# Patient Record
Sex: FEMALE | Race: WHITE | NOT HISPANIC OR LATINO | Employment: FULL TIME | URBAN - METROPOLITAN AREA
[De-identification: names, ages, dates, MRNs, and addresses within clinical notes are randomized per-mention and may not be internally consistent; named-entity substitution may affect disease eponyms.]

---

## 2023-09-14 ENCOUNTER — OFFICE VISIT (OUTPATIENT)
Dept: INTERNAL MEDICINE | Facility: CLINIC | Age: 43
End: 2023-09-14
Payer: COMMERCIAL

## 2023-09-14 VITALS
SYSTOLIC BLOOD PRESSURE: 112 MMHG | TEMPERATURE: 98.5 F | WEIGHT: 198.4 LBS | BODY MASS INDEX: 33.02 KG/M2 | HEART RATE: 105 BPM | DIASTOLIC BLOOD PRESSURE: 72 MMHG | RESPIRATION RATE: 16 BRPM | OXYGEN SATURATION: 98 %

## 2023-09-14 DIAGNOSIS — G43.809 OTHER MIGRAINE WITHOUT STATUS MIGRAINOSUS, NOT INTRACTABLE: ICD-10-CM

## 2023-09-14 DIAGNOSIS — Z12.31 ENCOUNTER FOR SCREENING MAMMOGRAM FOR BREAST CANCER: ICD-10-CM

## 2023-09-14 DIAGNOSIS — R00.2 PALPITATIONS: ICD-10-CM

## 2023-09-14 DIAGNOSIS — Z00.00 ANNUAL PHYSICAL EXAM: Primary | ICD-10-CM

## 2023-09-14 DIAGNOSIS — E53.8 B12 DEFICIENCY: ICD-10-CM

## 2023-09-14 DIAGNOSIS — Z11.59 SCREENING FOR VIRAL DISEASE: ICD-10-CM

## 2023-09-14 DIAGNOSIS — F17.210 CIGARETTE NICOTINE DEPENDENCE WITHOUT COMPLICATION: ICD-10-CM

## 2023-09-14 DIAGNOSIS — Z13.220 SCREENING FOR CHOLESTEROL LEVEL: ICD-10-CM

## 2023-09-14 DIAGNOSIS — Z13.29 SCREENING FOR THYROID DISORDER: ICD-10-CM

## 2023-09-14 DIAGNOSIS — R07.9 CHEST PAIN, UNSPECIFIED TYPE: ICD-10-CM

## 2023-09-14 LAB
ALBUMIN SERPL-MCNC: 4.3 G/DL (ref 3.5–5.2)
ALBUMIN/GLOB SERPL: 1.6 G/DL
ALP SERPL-CCNC: 71 U/L (ref 39–117)
ALT SERPL W P-5'-P-CCNC: 11 U/L (ref 1–33)
ANION GAP SERPL CALCULATED.3IONS-SCNC: 11.4 MMOL/L (ref 5–15)
AST SERPL-CCNC: 16 U/L (ref 1–32)
BASOPHILS # BLD AUTO: 0.04 10*3/MM3 (ref 0–0.2)
BASOPHILS NFR BLD AUTO: 0.6 % (ref 0–1.5)
BILIRUB SERPL-MCNC: 0.4 MG/DL (ref 0–1.2)
BUN SERPL-MCNC: 11 MG/DL (ref 6–20)
BUN/CREAT SERPL: 13.6 (ref 7–25)
CALCIUM SPEC-SCNC: 9.3 MG/DL (ref 8.6–10.5)
CHLORIDE SERPL-SCNC: 104 MMOL/L (ref 98–107)
CHOLEST SERPL-MCNC: 205 MG/DL (ref 0–200)
CO2 SERPL-SCNC: 23.6 MMOL/L (ref 22–29)
CREAT SERPL-MCNC: 0.81 MG/DL (ref 0.57–1)
DEPRECATED RDW RBC AUTO: 41.6 FL (ref 37–54)
EGFRCR SERPLBLD CKD-EPI 2021: 92.5 ML/MIN/1.73
EOSINOPHIL # BLD AUTO: 0.09 10*3/MM3 (ref 0–0.4)
EOSINOPHIL NFR BLD AUTO: 1.3 % (ref 0.3–6.2)
ERYTHROCYTE [DISTWIDTH] IN BLOOD BY AUTOMATED COUNT: 12.8 % (ref 12.3–15.4)
FOLATE SERPL-MCNC: 14.9 NG/ML (ref 4.78–24.2)
GLOBULIN UR ELPH-MCNC: 2.7 GM/DL
GLUCOSE SERPL-MCNC: 82 MG/DL (ref 65–99)
HCT VFR BLD AUTO: 37.4 % (ref 34–46.6)
HCV AB SER DONR QL: NORMAL
HDLC SERPL-MCNC: 46 MG/DL (ref 40–60)
HGB BLD-MCNC: 13.2 G/DL (ref 12–15.9)
IMM GRANULOCYTES # BLD AUTO: 0.02 10*3/MM3 (ref 0–0.05)
IMM GRANULOCYTES NFR BLD AUTO: 0.3 % (ref 0–0.5)
LDLC SERPL CALC-MCNC: 136 MG/DL (ref 0–100)
LDLC/HDLC SERPL: 2.9 {RATIO}
LYMPHOCYTES # BLD AUTO: 1.76 10*3/MM3 (ref 0.7–3.1)
LYMPHOCYTES NFR BLD AUTO: 26.3 % (ref 19.6–45.3)
MCH RBC QN AUTO: 32.3 PG (ref 26.6–33)
MCHC RBC AUTO-ENTMCNC: 35.3 G/DL (ref 31.5–35.7)
MCV RBC AUTO: 91.4 FL (ref 79–97)
MONOCYTES # BLD AUTO: 0.48 10*3/MM3 (ref 0.1–0.9)
MONOCYTES NFR BLD AUTO: 7.2 % (ref 5–12)
NEUTROPHILS NFR BLD AUTO: 4.3 10*3/MM3 (ref 1.7–7)
NEUTROPHILS NFR BLD AUTO: 64.3 % (ref 42.7–76)
NRBC BLD AUTO-RTO: 0 /100 WBC (ref 0–0.2)
PLATELET # BLD AUTO: 321 10*3/MM3 (ref 140–450)
PMV BLD AUTO: 10.4 FL (ref 6–12)
POTASSIUM SERPL-SCNC: 4.2 MMOL/L (ref 3.5–5.2)
PROT SERPL-MCNC: 7 G/DL (ref 6–8.5)
RBC # BLD AUTO: 4.09 10*6/MM3 (ref 3.77–5.28)
SODIUM SERPL-SCNC: 139 MMOL/L (ref 136–145)
TRIGL SERPL-MCNC: 127 MG/DL (ref 0–150)
TSH SERPL DL<=0.05 MIU/L-ACNC: 1.38 UIU/ML (ref 0.27–4.2)
VIT B12 BLD-MCNC: 592 PG/ML (ref 211–946)
VLDLC SERPL-MCNC: 23 MG/DL (ref 5–40)
WBC NRBC COR # BLD: 6.69 10*3/MM3 (ref 3.4–10.8)

## 2023-09-14 PROCEDURE — 85025 COMPLETE CBC W/AUTO DIFF WBC: CPT | Performed by: PHYSICIAN ASSISTANT

## 2023-09-14 PROCEDURE — 86803 HEPATITIS C AB TEST: CPT | Performed by: PHYSICIAN ASSISTANT

## 2023-09-14 PROCEDURE — 82746 ASSAY OF FOLIC ACID SERUM: CPT | Performed by: PHYSICIAN ASSISTANT

## 2023-09-14 PROCEDURE — 82607 VITAMIN B-12: CPT | Performed by: PHYSICIAN ASSISTANT

## 2023-09-14 PROCEDURE — 80053 COMPREHEN METABOLIC PANEL: CPT | Performed by: PHYSICIAN ASSISTANT

## 2023-09-14 PROCEDURE — 80061 LIPID PANEL: CPT | Performed by: PHYSICIAN ASSISTANT

## 2023-09-14 PROCEDURE — 84443 ASSAY THYROID STIM HORMONE: CPT | Performed by: PHYSICIAN ASSISTANT

## 2023-09-14 NOTE — ASSESSMENT & PLAN NOTE
EKG WNL today. Will request previous records and echo. Will get labs today. To er if red flag sx occur, chest pain that moves to jaw/shoulder, dizziness, syncope, loc. Will order echo and holter to evaluate symptoms further. Pt understands and agrees with plan.

## 2023-09-14 NOTE — PROGRESS NOTES
Chief Complaint  EST CARE, physical, Chest pain    Subjective          Sheba Arnett presents to [facility]  History of Present Illness  Last PCP: Graciela Ruelas in [facility]  Previous Specialists: none   Last labs: months ago  Last mammogram: >1 yr , family history of breast cancer- maternal grandma  Last pap: with hysterectomy   Last colonoscopy: none,  no family history of colon cancer     Palpitations: states at times heart flutters.   Sx comes and goes.   At times if she lays on L side she gets chest pain. Last time this occurred was last wk.   States it happens at rest. Other times with activity. Denies radiation of jaw/shoulder pain.  Denies current chest pain.   She admits to swelling in feet at times. She works 10-12 hr/day on her feet.   She is a  and does pipe laying/digging.    Pt was told she had a leaky valve in heart a few years ago   She states she had stress test and echo done at this time.   States she had regurgitation    Smokes 2 PPD x 30 years     Migraine: had one a month ago  Has been 8 yr since prior    B12 def: previously on supplement    History reviewed. No pertinent past medical history.     Past Surgical History:   Procedure Laterality Date    APPENDECTOMY      HYSTERECTOMY      NISSEN FUNDOPLICATION          Current Outpatient Medications on File Prior to Visit   Medication Sig Dispense Refill    acyclovir (ZOVIRAX) 400 MG tablet Take 2 Tablet by mouth Five times a day for 7 days.      cetirizine (zyrTEC) 10 MG tablet Take 1 tablet by mouth Daily. 14 tablet 0    cyclobenzaprine (FLEXERIL) 10 MG tablet Take 1 tablet by mouth 3 (Three) Times a Day As Needed.       MG tablet Take 1 Tablet by mouth EVERY 6 TO 8 HOURS for Pain.      omeprazole (priLOSEC) 40 MG capsule Take 1 capsule by mouth Daily.      SUMAtriptan (IMITREX) 50 MG tablet Take 1 tablet by mouth Every 2 (Two) Hours As Needed for Migraine. Take one tablet at onset of  headache. May repeat dose one time in 2 hours if headache not relieved.  Do not take more than 4 tablets in a 24-hour period 8 tablet 0     No current facility-administered medications on file prior to visit.        Allergies   Allergen Reactions    Codeine Itching       Social History     Tobacco Use   Smoking Status Every Day    Packs/day: 2.00    Years: 30.00    Pack years: 60.00    Types: Cigarettes   Smokeless Tobacco Never          Objective   Vital Signs:   /72 (BP Location: Left arm, Patient Position: Sitting, Cuff Size: Adult)   Pulse 105   Temp 98.5 °F (36.9 °C) (Temporal)   Resp 16   Wt 90 kg (198 lb 6.4 oz)   SpO2 98%   BMI 33.02 kg/m²     Physical Exam  Vitals reviewed.   Constitutional:       Appearance: Normal appearance.   HENT:      Head: Normocephalic and atraumatic.      Nose: Nose normal.      Mouth/Throat:      Mouth: Mucous membranes are moist.   Eyes:      Extraocular Movements: Extraocular movements intact.      Conjunctiva/sclera: Conjunctivae normal.      Pupils: Pupils are equal, round, and reactive to light.   Cardiovascular:      Rate and Rhythm: Normal rate and regular rhythm.   Pulmonary:      Effort: Pulmonary effort is normal.      Breath sounds: Normal breath sounds.   Abdominal:      General: Abdomen is flat. Bowel sounds are normal.      Palpations: Abdomen is soft.   Musculoskeletal:         General: Normal range of motion.   Neurological:      General: No focal deficit present.      Mental Status: She is alert and oriented to person, place, and time.   Psychiatric:         Mood and Affect: Mood normal.      Result Review :            BMI is >= 30 and <35. (Class 1 Obesity). The following options were offered after discussion;: weight loss educational material (shared in after visit summary)      ECG 12 Lead    Date/Time: 9/15/2023 9:20 AM  Performed by: Rita Mcdonough PA-C  Authorized by: Rita Mcdonough PA-C   Comparison: not compared with previous ECG    Previous ECG: no previous ECG available  Rhythm: sinus rhythm  Rate: normal  BPM: 75  Conduction: conduction normal  ST Segments: ST segments normal  T Waves: T waves normal  QRS axis: normal  Other: no other findings    Clinical impression: normal ECG           Assessment and Plan    Diagnoses and all orders for this visit:    1. Annual physical exam (Primary)  Assessment & Plan:  Reviewed preventative medication recommendations that are age appropriate for the patient. Education provided for health and wellness. Encouraged healthy diet, regular exercise, and routine wellness checkups.        2. Encounter for screening mammogram for breast cancer  -     Mammo Screening Digital Tomosynthesis Bilateral With CAD    3. Screening for viral disease  -     Hepatitis C antibody    4. Chest pain, unspecified type  Assessment & Plan:  EKG WNL today. Will request previous records and echo. Will get labs today. To er if red flag sx occur, chest pain that moves to jaw/shoulder, dizziness, syncope, loc. Will order echo and holter to evaluate symptoms further. Pt understands and agrees with plan.    Orders:  -     Comprehensive Metabolic Panel  -     CBC & Differential  -     ECG 12 Lead  -     Adult Transthoracic Echo Complete w/ Color, Spectral and Contrast if necessary per protocol; Future  -     Holter monitor - 24 hour; Future    5. Screening for cholesterol level  -     Lipid Panel    6. Screening for thyroid disorder  -     TSH    7. B12 deficiency  Comments:  will check level today to determine if tx indicated  Orders:  -     Vitamin B12  -     Folate    8. Other migraine without status migrainosus, not intractable  Comments:  cont PRN triptan    9. Palpitations  -     Adult Transthoracic Echo Complete w/ Color, Spectral and Contrast if necessary per protocol; Future  -     Holter monitor - 24 hour; Future    10. Cigarette nicotine dependence without complication  Assessment & Plan:  Discussed risks of smoking with  patient including death, blood pressure elevation, heart attack, stroke, kidney disese, blindness, slow wound healing. Pt is not ready to quit smoking at this time, encouraged to RTC once ready to quit.          Follow Up   Return in about 1 month (around 10/14/2023).  Patient was given instructions and counseling regarding her condition or for health maintenance advice. Please see specific information pulled into the AVS if appropriate.

## 2023-09-15 ENCOUNTER — TELEPHONE (OUTPATIENT)
Dept: INTERNAL MEDICINE | Facility: CLINIC | Age: 43
End: 2023-09-15
Payer: COMMERCIAL

## 2023-09-15 PROBLEM — Z00.00 ANNUAL PHYSICAL EXAM: Status: ACTIVE | Noted: 2023-09-15

## 2023-09-15 PROBLEM — Z00.00 ANNUAL PHYSICAL EXAM: Status: RESOLVED | Noted: 2023-09-15 | Resolved: 2023-09-15

## 2023-09-15 NOTE — TELEPHONE ENCOUNTER
Caller: Sheba Arnett    Relationship: Self    Best call back number: [phone]     What is the best time to reach you: ANYTIME    Who are you requesting to speak with (clinical staff, provider,  specific staff member): CLINICAL    Do you know the name of the person who called: NO    What was the call regarding: TEST RESULTS    Is it okay if the provider responds through [employer]: NO      PATIENT STATES SHE MISSED A CALL ABOUT HER RESULTS. PATIENT STATES SHE SEES THEM ON MYCHART BUT WANTS TO DISCUSS HER COVID TEST AND WHY SHE IS STILL POSITIVE STILL.

## 2023-09-15 NOTE — PROGRESS NOTES
I have reviewed the notes, assessments, and/or procedures performed by Rita Mcdonough PA-C, I concur with her documentation of Sheba Arnett.

## 2023-09-28 ENCOUNTER — HOSPITAL ENCOUNTER (OUTPATIENT)
Dept: MAMMOGRAPHY | Facility: HOSPITAL | Age: 43
Discharge: HOME | End: 2023-09-28
Admitting: PHYSICIAN ASSISTANT
Payer: COMMERCIAL

## 2023-09-28 PROCEDURE — 77067 SCR MAMMO BI INCL CAD: CPT

## 2023-09-28 PROCEDURE — 77063 BREAST TOMOSYNTHESIS BI: CPT

## 2023-10-06 ENCOUNTER — HOSPITAL ENCOUNTER (OUTPATIENT)
Dept: CARDIOLOGY | Facility: HOSPITAL | Age: 43
Discharge: HOME | End: 2023-10-06
Payer: COMMERCIAL

## 2023-10-06 DIAGNOSIS — R00.2 PALPITATIONS: ICD-10-CM

## 2023-10-06 DIAGNOSIS — R07.9 CHEST PAIN, UNSPECIFIED TYPE: ICD-10-CM

## 2023-10-06 PROCEDURE — 93306 TTE W/DOPPLER COMPLETE: CPT

## 2023-10-07 LAB
BH CV ECHO MEAS - AO MAX PG: 10 MMHG
BH CV ECHO MEAS - AO MEAN PG: 6 MMHG
BH CV ECHO MEAS - AO ROOT DIAM: 2.8 CM
BH CV ECHO MEAS - AO V2 MAX: 160 CM/SEC
BH CV ECHO MEAS - AO V2 VTI: 30 CM
BH CV ECHO MEAS - AVA(I,D): 2.8 CM2
BH CV ECHO MEAS - EDV(CUBED): 74.1 ML
BH CV ECHO MEAS - EDV(MOD-SP2): 77.6 ML
BH CV ECHO MEAS - EDV(MOD-SP4): 77.7 ML
BH CV ECHO MEAS - EF(MOD-BP): 59.2 %
BH CV ECHO MEAS - EF(MOD-SP2): 58.4 %
BH CV ECHO MEAS - EF(MOD-SP4): 58.8 %
BH CV ECHO MEAS - ESV(CUBED): 27 ML
BH CV ECHO MEAS - ESV(MOD-SP2): 32.3 ML
BH CV ECHO MEAS - ESV(MOD-SP4): 32 ML
BH CV ECHO MEAS - FS: 28.6 %
BH CV ECHO MEAS - IVS/LVPW: 0.77 CM
BH CV ECHO MEAS - IVSD: 1 CM
BH CV ECHO MEAS - LA DIMENSION: 2.7 CM
BH CV ECHO MEAS - LAT PEAK E' VEL: 20.2 CM/SEC
BH CV ECHO MEAS - LV MASS(C)D: 167.4 GRAMS
BH CV ECHO MEAS - LV MAX PG: 6.3 MMHG
BH CV ECHO MEAS - LV MEAN PG: 3 MMHG
BH CV ECHO MEAS - LV V1 MAX: 125 CM/SEC
BH CV ECHO MEAS - LV V1 VTI: 24.1 CM
BH CV ECHO MEAS - LVIDD: 4.2 CM
BH CV ECHO MEAS - LVIDS: 3 CM
BH CV ECHO MEAS - LVOT AREA: 3.5 CM2
BH CV ECHO MEAS - LVOT DIAM: 2.1 CM
BH CV ECHO MEAS - LVPWD: 1.3 CM
BH CV ECHO MEAS - MED PEAK E' VEL: 14.5 CM/SEC
BH CV ECHO MEAS - MV A MAX VEL: 92.1 CM/SEC
BH CV ECHO MEAS - MV DEC SLOPE: 868 CM/SEC2
BH CV ECHO MEAS - MV DEC TIME: 20 SEC
BH CV ECHO MEAS - MV E MAX VEL: 108 CM/SEC
BH CV ECHO MEAS - MV E/A: 1.17
BH CV ECHO MEAS - MV MAX PG: 8 MMHG
BH CV ECHO MEAS - MV MEAN PG: 3 MMHG
BH CV ECHO MEAS - MV P1/2T: 47.2 MSEC
BH CV ECHO MEAS - MV V2 VTI: 30.6 CM
BH CV ECHO MEAS - MVA(P1/2T): 4.7 CM2
BH CV ECHO MEAS - MVA(VTI): 2.7 CM2
BH CV ECHO MEAS - RVDD: 2.6 CM
BH CV ECHO MEAS - SV(LVOT): 83.5 ML
BH CV ECHO MEAS - SV(MOD-SP2): 45.3 ML
BH CV ECHO MEAS - SV(MOD-SP4): 45.7 ML
BH CV ECHO MEASUREMENTS AVERAGE E/E' RATIO: 6.22
IVRT: 61 MS
LEFT ATRIUM VOLUME INDEX: 11.2 ML/M2
LEFT ATRIUM VOLUME: 22.1 ML

## 2023-10-13 DIAGNOSIS — I51.7 LVH (LEFT VENTRICULAR HYPERTROPHY): Primary | ICD-10-CM

## 2023-10-13 DIAGNOSIS — R07.9 CHEST PAIN, UNSPECIFIED TYPE: ICD-10-CM

## 2023-10-26 ENCOUNTER — OFFICE VISIT (OUTPATIENT)
Dept: INTERNAL MEDICINE | Facility: CLINIC | Age: 43
End: 2023-10-26
Payer: COMMERCIAL

## 2023-10-26 VITALS
DIASTOLIC BLOOD PRESSURE: 66 MMHG | HEIGHT: 65 IN | BODY MASS INDEX: 34.32 KG/M2 | RESPIRATION RATE: 15 BRPM | SYSTOLIC BLOOD PRESSURE: 110 MMHG | WEIGHT: 206 LBS | TEMPERATURE: 97.6 F | OXYGEN SATURATION: 100 % | HEART RATE: 91 BPM

## 2023-10-26 DIAGNOSIS — F41.1 GENERALIZED ANXIETY DISORDER: ICD-10-CM

## 2023-10-26 DIAGNOSIS — R00.2 PALPITATIONS: Primary | ICD-10-CM

## 2023-10-26 PROCEDURE — 99213 OFFICE O/P EST LOW 20 MIN: CPT | Performed by: PHYSICIAN ASSISTANT

## 2023-10-26 PROCEDURE — 1159F MED LIST DOCD IN RCRD: CPT | Performed by: PHYSICIAN ASSISTANT

## 2023-10-26 PROCEDURE — 1160F RVW MEDS BY RX/DR IN RCRD: CPT | Performed by: PHYSICIAN ASSISTANT

## 2023-10-26 NOTE — ASSESSMENT & PLAN NOTE
Reviewed recent labs and echo. Discussed tx options. Pt will keep upcoming appt with cardiology to discuss. Discussed beta blocker could help with palpitations, anxiety, and has.

## 2023-10-26 NOTE — PROGRESS NOTES
"Chief Complaint  Palpitations    Subjective          Sheba Arnett presents to [facility]  History of Present Illness  BP: running 111//85 on home monitor   Palpitations were significant last night  She gets \"fluttery\" pain when it occurs  Pt feels worried all the time   Denies panic attacks  Denies feeling down, sad  Denies si/hi   Pt has been on buspar and clonopin in the past  Pt has appt 10/30 with cardiology     History reviewed. No pertinent past medical history.     Past Surgical History:   Procedure Laterality Date    APPENDECTOMY      HYSTERECTOMY      NISSEN FUNDOPLICATION          Current Outpatient Medications on File Prior to Visit   Medication Sig Dispense Refill    acyclovir (ZOVIRAX) 400 MG tablet Take 2 Tablet by mouth Five times a day for 7 days.      cetirizine (zyrTEC) 10 MG tablet Take 1 tablet by mouth Daily. 14 tablet 0    cyclobenzaprine (FLEXERIL) 10 MG tablet Take 1 tablet by mouth 3 (Three) Times a Day As Needed.       MG tablet Take 1 Tablet by mouth EVERY 6 TO 8 HOURS for Pain.      omeprazole (priLOSEC) 40 MG capsule Take 1 capsule by mouth Daily.      SUMAtriptan (IMITREX) 50 MG tablet Take 1 tablet by mouth Every 2 (Two) Hours As Needed for Migraine. Take one tablet at onset of headache. May repeat dose one time in 2 hours if headache not relieved.  Do not take more than 4 tablets in a 24-hour period 8 tablet 0     No current facility-administered medications on file prior to visit.        Allergies   Allergen Reactions    Codeine Itching       Social History     Tobacco Use   Smoking Status Every Day    Packs/day: 2.00    Years: 30.00    Additional pack years: 0.00    Total pack years: 60.00    Types: Cigarettes   Smokeless Tobacco Never          Objective   Vital Signs:   /66 (BP Location: Right arm, Patient Position: Sitting, Cuff Size: Adult)   Pulse 91   Temp 97.6 °F (36.4 °C) (Temporal)   Resp 15   Ht 165.1 cm " "(65\")   Wt 93.4 kg (206 lb)   SpO2 100%   BMI 34.28 kg/m²     Physical Exam  Vitals reviewed.   Constitutional:       Appearance: Normal appearance.   HENT:      Head: Normocephalic and atraumatic.      Nose: Nose normal.      Mouth/Throat:      Mouth: Mucous membranes are moist.   Eyes:      Extraocular Movements: Extraocular movements intact.      Conjunctiva/sclera: Conjunctivae normal.      Pupils: Pupils are equal, round, and reactive to light.   Cardiovascular:      Rate and Rhythm: Normal rate and regular rhythm.   Pulmonary:      Effort: Pulmonary effort is normal.      Breath sounds: Normal breath sounds.   Abdominal:      General: Abdomen is flat. Bowel sounds are normal.      Palpations: Abdomen is soft.   Musculoskeletal:         General: Normal range of motion.   Neurological:      General: No focal deficit present.      Mental Status: She is alert and oriented to person, place, and time.   Psychiatric:         Mood and Affect: Mood normal.        Result Review :   The following data was reviewed by: Rita Mcdonough PA-C on 10/26/2023:    Data reviewed : Radiologic studies echo                      Assessment and Plan    Diagnoses and all orders for this visit:    1. Palpitations (Primary)  Assessment & Plan:  Reviewed recent labs and echo. Discussed tx options. Pt will keep upcoming appt with cardiology to discuss. Discussed beta blocker could help with palpitations, anxiety, and has.      2. Generalized anxiety disorder  Comments:  Discussed anxiety not well controlled. will discussed preventative medicine at f/u. Pt declines hydroxazine.        Follow Up   Return in about 1 month (around 11/26/2023).  Patient was given instructions and counseling regarding her condition or for health maintenance advice. Please see specific information pulled into the AVS if appropriate.         "

## 2023-10-30 ENCOUNTER — OFFICE VISIT (OUTPATIENT)
Dept: CARDIOLOGY | Facility: CLINIC | Age: 43
End: 2023-10-30
Payer: COMMERCIAL

## 2023-10-30 ENCOUNTER — LAB (OUTPATIENT)
Dept: LAB | Facility: HOSPITAL | Age: 43
End: 2023-10-30
Payer: COMMERCIAL

## 2023-10-30 VITALS
WEIGHT: 206 LBS | HEIGHT: 65 IN | DIASTOLIC BLOOD PRESSURE: 76 MMHG | HEART RATE: 101 BPM | BODY MASS INDEX: 34.32 KG/M2 | SYSTOLIC BLOOD PRESSURE: 111 MMHG

## 2023-10-30 DIAGNOSIS — R07.9 CHEST PAIN, UNSPECIFIED TYPE: Primary | ICD-10-CM

## 2023-10-30 DIAGNOSIS — R42 POSTURAL DIZZINESS WITH PRESYNCOPE: ICD-10-CM

## 2023-10-30 DIAGNOSIS — R07.9 CHEST PAIN, UNSPECIFIED TYPE: ICD-10-CM

## 2023-10-30 DIAGNOSIS — R55 POSTURAL DIZZINESS WITH PRESYNCOPE: ICD-10-CM

## 2023-10-30 LAB — D DIMER PPP FEU-MCNC: 0.41 MCGFEU/ML (ref 0–0.5)

## 2023-10-30 PROCEDURE — 99204 OFFICE O/P NEW MOD 45 MIN: CPT | Performed by: INTERNAL MEDICINE

## 2023-10-30 PROCEDURE — 85379 FIBRIN DEGRADATION QUANT: CPT

## 2023-10-30 PROCEDURE — 36415 COLL VENOUS BLD VENIPUNCTURE: CPT

## 2023-10-30 NOTE — PROGRESS NOTES
"Chief Complaint  Establish Care, Chest Pain, and Shortness of Breath    Subjective        Sheba Arnett presents to [facility]  History of present illness:    Patient is a 43-year-old female who presents with chest pain.  She states it is under the left breast into the left ribs and up to the collarbone.  It is much worse when she is laying down at night.  She describes it as a \"poking\" that also takes her breath.  If she is lying on her left side she will have to move over or sit up in bed.  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  She also notes occasional dizziness when she gets up where things seem to be kind of 6 spinning type sensation.  She has used Flexeril and ibuprofen for years but just as needed.  She has not taken any ibuprofen recently.  She does smoke 2 packs/day and notes rare alcohol.  Mother has a history of atrial fibrillation.      History reviewed. No pertinent past medical history.      Past Surgical History:   Procedure Laterality Date    APPENDECTOMY      HYSTERECTOMY      NISSEN FUNDOPLICATION            Social History     Socioeconomic History    Marital status: Single   Tobacco Use    Smoking status: Every Day     Packs/day: 2.00     Years: 30.00     Additional pack years: 0.00     Total pack years: 60.00     Types: Cigarettes    Smokeless tobacco: Never   Vaping Use    Vaping Use: Never used   Substance and Sexual Activity    Alcohol use: Not Currently    Drug use: Never    Sexual activity: Yes     Partners: Male     Birth control/protection: Hysterectomy         Family History   Problem Relation Age of Onset    Hypertension Mother     Hypertension Maternal Grandmother     Diabetes Maternal Grandmother     Breast cancer Maternal Grandmother     Diabetes Paternal Uncle           Allergies   Allergen Reactions    Codeine Itching            Current Outpatient Medications:     acyclovir (ZOVIRAX) 400 MG tablet, Take 2 Tablet by mouth Five times a " "day for 7 days., Disp: , Rfl:     cetirizine (zyrTEC) 10 MG tablet, Take 1 tablet by mouth Daily., Disp: 14 tablet, Rfl: 0    cyclobenzaprine (FLEXERIL) 10 MG tablet, Take 1 tablet by mouth 3 (Three) Times a Day As Needed., Disp: , Rfl:      MG tablet, Take 1 Tablet by mouth EVERY 6 TO 8 HOURS for Pain., Disp: , Rfl:     omeprazole (priLOSEC) 40 MG capsule, Take 1 capsule by mouth Daily., Disp: , Rfl:     SUMAtriptan (IMITREX) 50 MG tablet, Take 1 tablet by mouth Every 2 (Two) Hours As Needed for Migraine. Take one tablet at onset of headache. May repeat dose one time in 2 hours if headache not relieved.  Do not take more than 4 tablets in a 24-hour period, Disp: 8 tablet, Rfl: 0      ROS:  Cardiac review of systems positive for atypical chest pain and shortness of breath    Objective     /76   Pulse 101   Ht 165.1 cm (65\")   Wt 93.4 kg (206 lb)   BMI 34.28 kg/m²       General Appearance:   well developed  well nourished  HENT:   oropharynx moist  lips not cyanotic  Respiratory:  no respiratory distress  normal breath sounds  no rales  Cardiovascular:  no jugular venous distention  regular rhythm  S1 normal, S2 normal  no S3, no S4   no murmur  no rub, no thrill  No carotid bruit  pedal pulses normal  lower extremity edema: none    Musculoskeletal:  no clubbing of fingers.   normocephalic, head atraumatic  Skin:   warm, dry  Psychiatric:  judgement and insight appropriate  normal mood and affect    ECHO:  Results for orders placed during the hospital encounter of 10/06/23    Adult Transthoracic Echo Complete w/ Color, Spectral and Contrast if necessary per protocol    Interpretation Summary    Left ventricular ejection fraction appears to be 56 - 60%.    Left ventricular wall thickness is consistent with mild septal asymmetric hypertrophy.    Left ventricular diastolic function was normal.    There were no apparent intracardiac masses, vegetations or thrombi.    STRESS:    CATH:  No results found " for this or any previous visit.    BMP:     Glucose   Date Value Ref Range Status   09/14/2023 82 65 - 99 mg/dL Final     BUN   Date Value Ref Range Status   09/14/2023 11 6 - 20 mg/dL Final     Creatinine   Date Value Ref Range Status   09/14/2023 0.81 0.57 - 1.00 mg/dL Final     Sodium   Date Value Ref Range Status   09/14/2023 139 136 - 145 mmol/L Final     Potassium   Date Value Ref Range Status   09/14/2023 4.2 3.5 - 5.2 mmol/L Final     Chloride   Date Value Ref Range Status   09/14/2023 104 98 - 107 mmol/L Final     CO2   Date Value Ref Range Status   09/14/2023 23.6 22.0 - 29.0 mmol/L Final     Calcium   Date Value Ref Range Status   09/14/2023 9.3 8.6 - 10.5 mg/dL Final     BUN/Creatinine Ratio   Date Value Ref Range Status   09/14/2023 13.6 7.0 - 25.0 Final     Anion Gap   Date Value Ref Range Status   09/14/2023 11.4 5.0 - 15.0 mmol/L Final     eGFR   Date Value Ref Range Status   09/14/2023 92.5 >60.0 mL/min/1.73 Final     LIPIDS:  Total Cholesterol   Date Value Ref Range Status   09/14/2023 205 (H) 0 - 200 mg/dL Final     Triglycerides   Date Value Ref Range Status   09/14/2023 127 0 - 150 mg/dL Final     HDL Cholesterol   Date Value Ref Range Status   09/14/2023 46 40 - 60 mg/dL Final     LDL Cholesterol    Date Value Ref Range Status   09/14/2023 136 (H) 0 - 100 mg/dL Final     VLDL Cholesterol   Date Value Ref Range Status   09/14/2023 23 5 - 40 mg/dL Final     LDL/HDL Ratio   Date Value Ref Range Status   09/14/2023 2.90  Final         Procedures             ASSESSMENT:  Diagnoses and all orders for this visit:    1. Chest pain, unspecified type (Primary)  -     D-dimer, Quantitative; Future    2. Postural dizziness with presyncope         PLAN:    1.  Patient's chest pain is very atypical for a cardiac source.  It is much worse if she lies down.  She had an echocardiogram that showed basically normal heart structure.  There was no pericardial effusion.  Her EKG done 9/15/2023 showed normal sinus  rhythm with no evidence of pericarditis.  2.  I am not sure if this is inflammation in the chest wall as she works in construction, pleuritis or less likely pericarditis.  These are all inflammatory conditions and I have asked her to take her ibuprofen twice a day for a week to see if it resolves.  This does not sound consistent with angina.  3.  Patient smokes 2 packs a day.  When she returns we will talk to her about Chantix.  4.  Patient had cholesterol checked 9/14/2023 which showed , HDL 46, and triglycerides 127.  These are very near goal.  5.  Of note the patient was diagnosed with COVID right before the apparent symptoms started.  6.  Patient's dizziness sensation sounds more consistent with in her ear.  If it continues we may consider sending her to an ENT doctor.  7.  We will bring back in 2 to 3 weeks to reassess.  I am also going to check a D-dimer.      Return in about 3 weeks (around 11/20/2023) for abilio liu.     Patient was given instructions and counseling regarding her condition or for health maintenance advice. Please see specific information pulled into the AVS if appropriate.         Jairon Groves MD   10/30/2023  14:12 EDT

## 2023-11-02 ENCOUNTER — PATIENT ROUNDING (BHMG ONLY) (OUTPATIENT)
Dept: CARDIOLOGY | Facility: CLINIC | Age: 43
End: 2023-11-02
Payer: COMMERCIAL

## 2023-11-02 NOTE — PROGRESS NOTES
**A eelusionhart message has been sent fot PATIENT ROUNDING with [facility] .

## 2023-11-06 ENCOUNTER — HOSPITAL ENCOUNTER (OUTPATIENT)
Dept: CARDIOLOGY | Facility: HOSPITAL | Age: 43
Discharge: HOME | End: 2023-11-06
Admitting: PHYSICIAN ASSISTANT
Payer: COMMERCIAL

## 2023-11-06 DIAGNOSIS — R07.9 CHEST PAIN, UNSPECIFIED TYPE: ICD-10-CM

## 2023-11-06 DIAGNOSIS — R00.2 PALPITATIONS: ICD-10-CM

## 2023-11-06 PROCEDURE — 93225 XTRNL ECG REC<48 HRS REC: CPT

## 2023-11-08 ENCOUNTER — OFFICE VISIT (OUTPATIENT)
Dept: CARDIOLOGY | Facility: CLINIC | Age: 43
End: 2023-11-08
Payer: COMMERCIAL

## 2023-11-08 VITALS
DIASTOLIC BLOOD PRESSURE: 78 MMHG | BODY MASS INDEX: 34.35 KG/M2 | WEIGHT: 206.2 LBS | HEIGHT: 65 IN | HEART RATE: 80 BPM | SYSTOLIC BLOOD PRESSURE: 118 MMHG

## 2023-11-08 DIAGNOSIS — R07.9 CHEST PAIN, UNSPECIFIED TYPE: Primary | ICD-10-CM

## 2023-11-08 DIAGNOSIS — R00.2 PALPITATIONS: ICD-10-CM

## 2023-11-08 PROCEDURE — 99214 OFFICE O/P EST MOD 30 MIN: CPT

## 2023-11-08 PROCEDURE — 1159F MED LIST DOCD IN RCRD: CPT

## 2023-11-08 PROCEDURE — 1160F RVW MEDS BY RX/DR IN RCRD: CPT

## 2023-11-08 NOTE — PROGRESS NOTES
Chief Complaint  Chest pain, unspecified type and Follow-up (3 week f/u.  ECHO completed.)    Subjective        History of Present Illness  Sheba Arnett presents to [facility] for follow up.       History reviewed. No pertinent past medical history.    ALLERGY  Allergies   Allergen Reactions    Codeine Itching        Past Surgical History:   Procedure Laterality Date    APPENDECTOMY      HYSTERECTOMY      NISSEN FUNDOPLICATION          Social History     Socioeconomic History    Marital status: Single   Tobacco Use    Smoking status: Every Day     Packs/day: 1.50     Years: 30.00     Additional pack years: 0.00     Total pack years: 45.00     Types: Cigarettes    Smokeless tobacco: Never   Vaping Use    Vaping Use: Never used   Substance and Sexual Activity    Alcohol use: Not Currently    Drug use: Never    Sexual activity: Yes     Partners: Male     Birth control/protection: Hysterectomy       Family History   Problem Relation Age of Onset    Hypertension Mother     Hypertension Maternal Grandmother     Diabetes Maternal Grandmother     Breast cancer Maternal Grandmother     Diabetes Paternal Uncle         Current Outpatient Medications on File Prior to Visit   Medication Sig    acyclovir (ZOVIRAX) 400 MG tablet Take 2 Tablet by mouth Five times a day for 7 days.    cetirizine (zyrTEC) 10 MG tablet Take 1 tablet by mouth Daily.    cyclobenzaprine (FLEXERIL) 10 MG tablet Take 1 tablet by mouth 3 (Three) Times a Day As Needed.     MG tablet Take 1 Tablet by mouth EVERY 6 TO 8 HOURS for Pain.    omeprazole (priLOSEC) 40 MG capsule Take 1 capsule by mouth Daily.    SUMAtriptan (IMITREX) 50 MG tablet Take 1 tablet by mouth Every 2 (Two) Hours As Needed for Migraine. Take one tablet at onset of headache. May repeat dose one time in 2 hours if headache not relieved.  Do not take more than 4 tablets in a 24-hour period     No current facility-administered medications on file prior to  "visit.       Objective   Vitals:    11/08/23 1138   BP: 118/78   Pulse: 80   Weight: 93.5 kg (206 lb 3.2 oz)   Height: 165.1 cm (65\")       Physical Exam  Constitutional:       General: She is awake. She is not in acute distress.     Appearance: Normal appearance.   HENT:      Head: Normocephalic.      Nose: Nose normal. No congestion.   Eyes:      Extraocular Movements: Extraocular movements intact.      Conjunctiva/sclera: Conjunctivae normal.      Pupils: Pupils are equal, round, and reactive to light.   Neck:      Thyroid: No thyromegaly.      Vascular: No JVD.   Cardiovascular:      Rate and Rhythm: Normal rate and regular rhythm.      Chest Wall: PMI is not displaced.      Pulses: Normal pulses.      Heart sounds: Normal heart sounds, S1 normal and S2 normal. No murmur heard.     No friction rub. No gallop. No S3 or S4 sounds.   Pulmonary:      Effort: Pulmonary effort is normal.      Breath sounds: Normal breath sounds. No wheezing, rhonchi or rales.   Abdominal:      General: Bowel sounds are normal.      Palpations: Abdomen is soft.      Tenderness: There is no abdominal tenderness.   Musculoskeletal:      Cervical back: No tenderness.      Right lower leg: No edema.      Left lower leg: No edema.   Lymphadenopathy:      Cervical: No cervical adenopathy.   Skin:     General: Skin is warm and dry.      Capillary Refill: Capillary refill takes less than 2 seconds.      Coloration: Skin is not cyanotic.      Findings: No petechiae or rash.      Nails: There is no clubbing.   Neurological:      Mental Status: She is alert.   Psychiatric:         Mood and Affect: Mood normal.         Behavior: Behavior is cooperative.           Result Review     The following data was reviewed by LACI Regan on 11/08/23.    No results found for: \"PROBNP\"  CMP          9/14/2023    16:15   CMP   Glucose 82    BUN 11    Creatinine 0.81    EGFR 92.5    Sodium 139    Potassium 4.2    Chloride 104    Calcium 9.3    Total " Protein 7.0    Albumin 4.3    Globulin 2.7    Total Bilirubin 0.4    Alkaline Phosphatase 71    AST (SGOT) 16    ALT (SGPT) 11    Albumin/Globulin Ratio 1.6    BUN/Creatinine Ratio 13.6    Anion Gap 11.4      CBC w/diff          9/14/2023    16:15   CBC w/Diff   WBC 6.69    RBC 4.09    Hemoglobin 13.2    Hematocrit 37.4    MCV 91.4    MCH 32.3    MCHC 35.3    RDW 12.8    Platelets 321    Neutrophil Rel % 64.3    Immature Granulocyte Rel % 0.3    Lymphocyte Rel % 26.3    Monocyte Rel % 7.2    Eosinophil Rel % 1.3    Basophil Rel % 0.6       Lipid Panel          9/14/2023    16:15   Lipid Panel   Total Cholesterol 205    Triglycerides 127    HDL Cholesterol 46    VLDL Cholesterol 23    LDL Cholesterol  136    LDL/HDL Ratio 2.90        Results for orders placed during the hospital encounter of 10/06/23    Adult Transthoracic Echo Complete w/ Color, Spectral and Contrast if necessary per protocol    Interpretation Summary    Left ventricular ejection fraction appears to be 56 - 60%.    Left ventricular wall thickness is consistent with mild septal asymmetric hypertrophy.    Left ventricular diastolic function was normal.    There were no apparent intracardiac masses, vegetations or thrombi.           Procedures    Assessment & Plan  There are no diagnoses linked to this encounter.    Follow Up   No follow-ups on file.    Patient was given instructions and counseling regarding her condition or for health maintenance advice. Please see specific information pulled into the AVS if appropriate.     Leonor Salcedo, APRN  11/08/23  11:50 EST    Dictated Utilizing Dragon Dictation

## 2023-11-08 NOTE — PROGRESS NOTES
Chief Complaint  Chest pain, unspecified type and Follow-up (3 week f/u.  ECHO completed.)    Subjective        History of Present Illness  Sheba Arnett presents to [facility] for follow up.  Patient is a 43-year-old female with no significant past medical history who presents for follow-up.  She continues to have chest discomfort that occurs at rest.  She notices that most often at night when she lays down to go to sleep.  She feels as though it her heart is flip-flopping.  She has a pressure in the left breast that is bothersome for her.  It is nonexertional.  She has no associated dyspnea.  She denies any dizziness, lightheadedness or syncope.      History reviewed. No pertinent past medical history.    ALLERGY  Allergies   Allergen Reactions    Codeine Itching        Past Surgical History:   Procedure Laterality Date    APPENDECTOMY      HYSTERECTOMY      NISSEN FUNDOPLICATION          Social History     Socioeconomic History    Marital status: Single   Tobacco Use    Smoking status: Every Day     Packs/day: 1.50     Years: 30.00     Additional pack years: 0.00     Total pack years: 45.00     Types: Cigarettes    Smokeless tobacco: Never   Vaping Use    Vaping Use: Never used   Substance and Sexual Activity    Alcohol use: Not Currently    Drug use: Never    Sexual activity: Yes     Partners: Male     Birth control/protection: Hysterectomy       Family History   Problem Relation Age of Onset    Hypertension Mother     Hypertension Maternal Grandmother     Diabetes Maternal Grandmother     Breast cancer Maternal Grandmother     Diabetes Paternal Uncle         Current Outpatient Medications on File Prior to Visit   Medication Sig    acyclovir (ZOVIRAX) 400 MG tablet Take 2 Tablet by mouth Five times a day for 7 days.    cetirizine (zyrTEC) 10 MG tablet Take 1 tablet by mouth Daily.    cyclobenzaprine (FLEXERIL) 10 MG tablet Take 1 tablet by mouth 3 (Three) Times a Day As Needed.    IBU  "800 MG tablet Take 1 Tablet by mouth EVERY 6 TO 8 HOURS for Pain.    omeprazole (priLOSEC) 40 MG capsule Take 1 capsule by mouth Daily.    SUMAtriptan (IMITREX) 50 MG tablet Take 1 tablet by mouth Every 2 (Two) Hours As Needed for Migraine. Take one tablet at onset of headache. May repeat dose one time in 2 hours if headache not relieved.  Do not take more than 4 tablets in a 24-hour period     No current facility-administered medications on file prior to visit.       Objective   Vitals:    11/08/23 1138   BP: 118/78   Pulse: 80   Weight: 93.5 kg (206 lb 3.2 oz)   Height: 165.1 cm (65\")       Physical Exam  Constitutional:       General: She is awake. She is not in acute distress.     Appearance: Normal appearance.   HENT:      Head: Normocephalic.      Nose: Nose normal. No congestion.   Eyes:      Extraocular Movements: Extraocular movements intact.      Conjunctiva/sclera: Conjunctivae normal.      Pupils: Pupils are equal, round, and reactive to light.   Neck:      Thyroid: No thyromegaly.      Vascular: No JVD.   Cardiovascular:      Rate and Rhythm: Normal rate and regular rhythm.      Chest Wall: PMI is not displaced.      Pulses: Normal pulses.      Heart sounds: Normal heart sounds, S1 normal and S2 normal. No murmur heard.     No friction rub. No gallop. No S3 or S4 sounds.   Pulmonary:      Effort: Pulmonary effort is normal.      Breath sounds: Normal breath sounds. No wheezing, rhonchi or rales.   Abdominal:      General: Bowel sounds are normal.      Palpations: Abdomen is soft.      Tenderness: There is no abdominal tenderness.   Musculoskeletal:      Cervical back: No tenderness.      Right lower leg: No edema.      Left lower leg: No edema.   Lymphadenopathy:      Cervical: No cervical adenopathy.   Skin:     General: Skin is warm and dry.      Capillary Refill: Capillary refill takes less than 2 seconds.      Coloration: Skin is not cyanotic.      Findings: No petechiae or rash.      Nails: There " "is no clubbing.   Neurological:      Mental Status: She is alert.   Psychiatric:         Mood and Affect: Mood normal.         Behavior: Behavior is cooperative.           Result Review     The following data was reviewed by LACI Regan on 11/08/23.    No results found for: \"PROBNP\"  CMP          9/14/2023    16:15   CMP   Glucose 82    BUN 11    Creatinine 0.81    EGFR 92.5    Sodium 139    Potassium 4.2    Chloride 104    Calcium 9.3    Total Protein 7.0    Albumin 4.3    Globulin 2.7    Total Bilirubin 0.4    Alkaline Phosphatase 71    AST (SGOT) 16    ALT (SGPT) 11    Albumin/Globulin Ratio 1.6    BUN/Creatinine Ratio 13.6    Anion Gap 11.4      CBC w/diff          9/14/2023    16:15   CBC w/Diff   WBC 6.69    RBC 4.09    Hemoglobin 13.2    Hematocrit 37.4    MCV 91.4    MCH 32.3    MCHC 35.3    RDW 12.8    Platelets 321    Neutrophil Rel % 64.3    Immature Granulocyte Rel % 0.3    Lymphocyte Rel % 26.3    Monocyte Rel % 7.2    Eosinophil Rel % 1.3    Basophil Rel % 0.6       Lipid Panel          9/14/2023    16:15   Lipid Panel   Total Cholesterol 205    Triglycerides 127    HDL Cholesterol 46    VLDL Cholesterol 23    LDL Cholesterol  136    LDL/HDL Ratio 2.90        Results for orders placed during the hospital encounter of 10/06/23    Adult Transthoracic Echo Complete w/ Color, Spectral and Contrast if necessary per protocol    Interpretation Summary    Left ventricular ejection fraction appears to be 56 - 60%.    Left ventricular wall thickness is consistent with mild septal asymmetric hypertrophy.    Left ventricular diastolic function was normal.    There were no apparent intracardiac masses, vegetations or thrombi.           Procedures    Assessment & Plan  Diagnoses and all orders for this visit:    1. Chest pain, unspecified type (Primary)    2. Palpitations    1.  She continues to have episodes of chest discomfort that occur mostly at rest.  It is atypical.  She describes it as a " fluttering in her chest that is associated with left breast pain.  Holter monitor is pending.  Recent echocardiogram was unremarkable.  If chest pain persist would consider stress testing.  2.  Pending test results.  Further recommendations to follow.    Follow Up   Return in about 3 months (around 2/8/2024) for With Dr. Groves.    Patient was given instructions and counseling regarding her condition or for health maintenance advice. Please see specific information pulled into the AVS if appropriate.     Leonor Salcedo, APRN  11/08/23  11:46 EST    Dictated Utilizing Dragon Dictation

## 2023-11-21 ENCOUNTER — OFFICE VISIT (OUTPATIENT)
Dept: INTERNAL MEDICINE | Facility: CLINIC | Age: 43
End: 2023-11-21
Payer: COMMERCIAL

## 2023-11-21 VITALS
WEIGHT: 215.2 LBS | DIASTOLIC BLOOD PRESSURE: 74 MMHG | SYSTOLIC BLOOD PRESSURE: 116 MMHG | OXYGEN SATURATION: 100 % | BODY MASS INDEX: 35.85 KG/M2 | HEIGHT: 65 IN | HEART RATE: 99 BPM | TEMPERATURE: 97.6 F

## 2023-11-21 DIAGNOSIS — H69.91 DYSFUNCTION OF RIGHT EUSTACHIAN TUBE: Primary | ICD-10-CM

## 2023-11-21 PROCEDURE — 1160F RVW MEDS BY RX/DR IN RCRD: CPT | Performed by: PHYSICIAN ASSISTANT

## 2023-11-21 PROCEDURE — 1159F MED LIST DOCD IN RCRD: CPT | Performed by: PHYSICIAN ASSISTANT

## 2023-11-21 PROCEDURE — 99213 OFFICE O/P EST LOW 20 MIN: CPT | Performed by: PHYSICIAN ASSISTANT

## 2023-11-21 RX ORDER — PSEUDOEPHEDRINE HCL 120 MG/1
120 TABLET, FILM COATED, EXTENDED RELEASE ORAL EVERY 12 HOURS
Qty: 30 TABLET | Refills: 0 | Status: SHIPPED | OUTPATIENT
Start: 2023-11-21

## 2023-11-21 RX ORDER — FLUTICASONE PROPIONATE 50 MCG
2 SPRAY, SUSPENSION (ML) NASAL DAILY
Qty: 11.1 ML | Refills: 2 | Status: SHIPPED | OUTPATIENT
Start: 2023-11-21

## 2023-11-21 NOTE — PROGRESS NOTES
"Chief Complaint  Earache (Right ear hurts and feels like she has some sinus drainage that she thinks could be from this time of year allergies. Took Zyrtec and Ibuprofen. )    Subjective          Sheba Arnett presents to [facility]    Ear pain- symptoms started yesterday.  She has noticed some fullness in her ears as well as some nasal congestion.  She has developed a cough.  No fevers.  She has taken some zyrtec.  No sick contacts that she is aware of.     Objective   Vital Signs:   /74 (BP Location: Left arm, Patient Position: Sitting, Cuff Size: Large Adult)   Pulse 99   Temp 97.6 °F (36.4 °C) (Temporal)   Ht 165.1 cm (65\")   Wt 97.6 kg (215 lb 3.2 oz)   SpO2 100%   BMI 35.81 kg/m²     Physical Exam  Vitals reviewed.   Constitutional:       Appearance: Normal appearance. She is well-developed.   HENT:      Head: Normocephalic and atraumatic.      Right Ear: Tympanic membrane, ear canal and external ear normal.      Left Ear: Tympanic membrane, ear canal and external ear normal.      Ears:      Comments: Effusions bilaterally     Nose: Nose normal. Congestion present.      Mouth/Throat:      Mouth: Mucous membranes are moist.      Pharynx: Posterior oropharyngeal erythema present.   Eyes:      Conjunctiva/sclera: Conjunctivae normal.      Pupils: Pupils are equal, round, and reactive to light.   Cardiovascular:      Rate and Rhythm: Normal rate and regular rhythm.      Heart sounds: No murmur heard.     No friction rub. No gallop.   Pulmonary:      Effort: Pulmonary effort is normal.      Breath sounds: Normal breath sounds. No wheezing or rhonchi.   Lymphadenopathy:      Cervical: Cervical adenopathy present.   Skin:     General: Skin is warm and dry.   Neurological:      Mental Status: She is alert and oriented to person, place, and time.      Cranial Nerves: No cranial nerve deficit.   Psychiatric:         Mood and Affect: Mood and affect normal.         " Behavior: Behavior normal.         Thought Content: Thought content normal.         Judgment: Judgment normal.        Result Review :          Procedures      Assessment and Plan    Diagnoses and all orders for this visit:    1. Dysfunction of right eustachian tube (Primary)  Assessment & Plan:  Reassuring exam without concern of infection currently.  Would recommend conservative treatment at this time with Flonase nasal spray and decongestant.  Symptoms most likely secondary to beginning of viral illness, continue to monitor for new or worsening symptoms.  Call for any questions or concerns.      Other orders  -     fluticasone (FLONASE) 50 MCG/ACT nasal spray; 2 sprays into the nostril(s) as directed by provider Daily.  Dispense: 11.1 mL; Refill: 2  -     pseudoephedrine (Sudafed 12 Hour) 120 MG 12 hr tablet; Take 1 tablet by mouth Every 12 (Twelve) Hours.  Dispense: 30 tablet; Refill: 0              Follow Up   No follow-ups on file.  Patient was given instructions and counseling regarding her condition or for health maintenance advice. Please see specific information pulled into the AVS if appropriate.

## 2023-11-21 NOTE — ASSESSMENT & PLAN NOTE
Reassuring exam without concern of infection currently.  Would recommend conservative treatment at this time with Flonase nasal spray and decongestant.  Symptoms most likely secondary to beginning of viral illness, continue to monitor for new or worsening symptoms.  Call for any questions or concerns.

## 2023-12-26 ENCOUNTER — HOSPITAL ENCOUNTER (EMERGENCY)
Facility: HOSPITAL | Age: 43
Discharge: HOME | End: 2023-12-26
Attending: EMERGENCY MEDICINE | Admitting: EMERGENCY MEDICINE
Payer: COMMERCIAL

## 2023-12-26 VITALS
HEART RATE: 91 BPM | BODY MASS INDEX: 34.89 KG/M2 | WEIGHT: 209.44 LBS | SYSTOLIC BLOOD PRESSURE: 131 MMHG | RESPIRATION RATE: 18 BRPM | TEMPERATURE: 97.5 F | HEIGHT: 65 IN | OXYGEN SATURATION: 98 % | DIASTOLIC BLOOD PRESSURE: 80 MMHG

## 2023-12-26 DIAGNOSIS — H66.90 ACUTE OTITIS MEDIA, UNSPECIFIED OTITIS MEDIA TYPE: Primary | ICD-10-CM

## 2023-12-26 LAB
FLUAV SUBTYP SPEC NAA+PROBE: NOT DETECTED
FLUBV RNA ISLT QL NAA+PROBE: NOT DETECTED
RSV RNA NPH QL NAA+NON-PROBE: NOT DETECTED
S PYO AG THROAT QL: NEGATIVE
SARS-COV-2 RNA RESP QL NAA+PROBE: NOT DETECTED

## 2023-12-26 PROCEDURE — 87081 CULTURE SCREEN ONLY: CPT | Performed by: EMERGENCY MEDICINE

## 2023-12-26 PROCEDURE — 87880 STREP A ASSAY W/OPTIC: CPT

## 2023-12-26 PROCEDURE — 87637 SARSCOV2&INF A&B&RSV AMP PRB: CPT

## 2023-12-26 PROCEDURE — 99283 EMERGENCY DEPT VISIT LOW MDM: CPT

## 2023-12-26 RX ORDER — CEFDINIR 300 MG/1
300 CAPSULE ORAL 2 TIMES DAILY
Qty: 14 CAPSULE | Refills: 0 | Status: SHIPPED | OUTPATIENT
Start: 2023-12-26 | End: 2024-01-02

## 2023-12-26 NOTE — ED PROVIDER NOTES
Time: 8:12 AM EST  Date of encounter:  12/26/2023  Independent Historian/Clinical History and Information was obtained by:   Patient    History is limited by: N/A    Chief Complaint: Left ear pain      History of Present Illness:  Patient is a 43 y.o. year old female who presents to the emergency department for evaluation of left ear pain and sore throat that began 7 days ago.  Patient states she has had nasal congestion for 1 week.  Patient states she was diagnosed with an ear infection 1 week ago and been taking Augmentin ever since but her ear pain has gotten worse.  Patient states she has a history of frequent ear infections.    HPI    Patient Care Team  Primary Care Provider: Rita Mcdonough PA-C    Past Medical History:     Allergies   Allergen Reactions    Codeine Itching     No past medical history on file.  Past Surgical History:   Procedure Laterality Date    APPENDECTOMY      HYSTERECTOMY      NISSEN FUNDOPLICATION       Family History   Problem Relation Age of Onset    Hypertension Mother     Hypertension Maternal Grandmother     Diabetes Maternal Grandmother     Breast cancer Maternal Grandmother     Diabetes Paternal Uncle        Home Medications:  Prior to Admission medications    Medication Sig Start Date End Date Taking? Authorizing Provider   acyclovir (ZOVIRAX) 400 MG tablet Take 2 Tablet by mouth Five times a day for 7 days. 5/1/23   Carlene Briggs MD   amoxicillin-clavulanate (AUGMENTIN) 875-125 MG per tablet Take 1 tablet by mouth 2 (Two) Times a Day for 7 days. 12/20/23 12/27/23  Amie Lowry APRN   cetirizine (zyrTEC) 10 MG tablet Take 1 tablet by mouth Daily. 8/16/23   Tj Yee DO   cyclobenzaprine (FLEXERIL) 10 MG tablet Take 1 tablet by mouth 3 (Three) Times a Day As Needed. 6/12/23   Carlene Briggs MD   fluticasone (FLONASE) 50 MCG/ACT nasal spray 2 sprays into the nostril(s) as directed by provider Daily. 11/21/23   Annalisa Taylor PA-C    MG tablet  "Take 1 Tablet by mouth EVERY 6 TO 8 HOURS for Pain. 6/12/23   Provider, MD Carlene   omeprazole (priLOSEC) 40 MG capsule Take 1 capsule by mouth Daily. 6/12/23   Provider, MD Carlene   pseudoephedrine (Sudafed 12 Hour) 120 MG 12 hr tablet Take 1 tablet by mouth Every 12 (Twelve) Hours. 11/21/23   Annalisa Taylor PA-C   SUMAtriptan (IMITREX) 50 MG tablet Take 1 tablet by mouth Every 2 (Two) Hours As Needed for Migraine. Take one tablet at onset of headache. May repeat dose one time in 2 hours if headache not relieved.  Do not take more than 4 tablets in a 24-hour period 7/17/23   Amie Lowry APRN        Social History:   Social History     Tobacco Use    Smoking status: Every Day     Packs/day: 1.50     Years: 30.00     Additional pack years: 0.00     Total pack years: 45.00     Types: Cigarettes    Smokeless tobacco: Never   Vaping Use    Vaping Use: Never used   Substance Use Topics    Alcohol use: Not Currently    Drug use: Never         Review of Systems:  Review of Systems   Constitutional:  Negative for chills and fever.   HENT:  Positive for congestion, ear pain and sore throat. Negative for rhinorrhea.    Eyes:  Negative for pain and visual disturbance.   Respiratory:  Negative for apnea, cough, chest tightness and shortness of breath.    Cardiovascular:  Negative for chest pain and palpitations.   Gastrointestinal:  Negative for abdominal pain, diarrhea, nausea and vomiting.   Genitourinary:  Negative for difficulty urinating and dysuria.   Musculoskeletal:  Negative for joint swelling and myalgias.   Skin:  Negative for color change.   Neurological:  Negative for seizures and headaches.   Psychiatric/Behavioral: Negative.     All other systems reviewed and are negative.       Physical Exam:  /80 (BP Location: Left arm, Patient Position: Sitting)   Pulse 91   Temp 97.5 °F (36.4 °C) (Oral)   Resp 18   Ht 165.1 cm (65\")   Wt 95 kg (209 lb 7 oz)   SpO2 98%   BMI 34.85 kg/m² "     Physical Exam  Vitals and nursing note reviewed.   Constitutional:       General: She is not in acute distress.     Appearance: Normal appearance. She is not toxic-appearing.   HENT:      Head:      Jaw: There is normal jaw occlusion.      Left Ear: Tympanic membrane is erythematous.      Nose: Congestion present.   Eyes:      General: Lids are normal.      Extraocular Movements: Extraocular movements intact.      Conjunctiva/sclera: Conjunctivae normal.      Pupils: Pupils are equal, round, and reactive to light.   Cardiovascular:      Rate and Rhythm: Normal rate and regular rhythm.      Pulses: Normal pulses.      Heart sounds: Normal heart sounds.   Pulmonary:      Effort: Pulmonary effort is normal. No respiratory distress.      Breath sounds: Normal breath sounds. No wheezing or rhonchi.   Abdominal:      General: Abdomen is flat.      Palpations: Abdomen is soft.      Tenderness: There is no abdominal tenderness. There is no guarding or rebound.   Musculoskeletal:         General: Normal range of motion.      Cervical back: Normal range of motion and neck supple.      Right lower leg: No edema.      Left lower leg: No edema.   Skin:     General: Skin is warm and dry.   Neurological:      Mental Status: She is alert and oriented to person, place, and time. Mental status is at baseline.   Psychiatric:         Mood and Affect: Mood normal.                  Procedures:  Procedures      Medical Decision Making:      Comorbidities that affect care:    None    External Notes reviewed:          The following orders were placed and all results were independently analyzed by me:  Orders Placed This Encounter   Procedures    COVID-19, FLU A/B, RSV PCR 1 HR TAT - Swab, Nasopharynx    Rapid Strep A Screen - Swab, Throat    Beta Strep Culture, Throat - Swab, Throat    Ambulatory Referral to ENT (Otolaryngology)       Medications Given in the Emergency Department:  Medications - No data to display     ED Course:          Labs:    Lab Results (last 24 hours)       Procedure Component Value Units Date/Time    COVID-19, FLU A/B, RSV PCR 1 HR TAT - Swab, Nasopharynx [[id number]]  (Normal) Collected: 12/26/23 0454    Specimen: Swab from Nasopharynx Updated: 12/26/23 0539     COVID19 Not Detected     Influenza A PCR Not Detected     Influenza B PCR Not Detected     RSV, PCR Not Detected    Narrative:      Fact sheet for providers: https://www.fda.gov/media/393323/download    Fact sheet for patients: https://www.fda.gov/media/668329/download    Test performed by PCR.    Rapid Strep A Screen - Swab, Throat [[id number]]  (Normal) Collected: 12/26/23 0454    Specimen: Swab from Throat Updated: 12/26/23 0512     Strep A Ag Negative    Beta Strep Culture, Throat - Swab, Throat [[id number]] Collected: 12/26/23 0454    Specimen: Swab from Throat Updated: 12/26/23 0512             Imaging:    No Radiology Exams Resulted Within Past 24 Hours      Differential Diagnosis and Discussion:    Ear Pain: Differential diagnosis includes but is not limited to this externa, otitis media, foreign body, bullous myringitis, furuncles, herpes zoster, mastoiditis, trauma, and tumors  Sore Throat: Differential diagnosis includes but is not limited to bacterial infection, viral infection, inhaled irritants, sinus drainage, thyroiditis, epiglottitis, and retropharyngeal abscess.    All labs were reviewed and interpreted by me.    MDM     COVID, influenza, RSV, and strep are negative.  Patient's left TM is consistent with otitis media.  I instructed the patient to stop taking Augmentin and I would switch her over to cefdinir.  I will also provide an ambulatory referral to ENT for frequent ear infections.  Instruct patient to return to the ED in the meantime she develops new or worsening symptoms.  Patient states he understands agrees with plan of care.          Patient Care Considerations:          Consultants/Shared Management Plan:    None    Social Determinants  of Health:    Patient is independent, reliable, and has access to care.       Disposition and Care Coordination:    Discharged: I considered escalation of care by admitting this patient to the hospital, however the patient has improved and is suitable and stable for discharge.    I have explained the patient´s condition, diagnoses and treatment plan based on the information available to me at this time. I have answered questions and addressed any concerns. The patient has a good  understanding of the patient´s diagnosis, condition, and treatment plan as can be expected at this point. The vital signs have been stable. The patient´s condition is stable and appropriate for discharge from the emergency department.      The patient will pursue further outpatient evaluation with the primary care physician or other designated or consulting physician as outlined in the discharge instructions. They are agreeable to this plan of care and follow-up instructions have been explained in detail. The patient has received these instructions in written format and have expressed an understanding of the discharge instructions. The patient is aware that any significant change in condition or worsening of symptoms should prompt an immediate return to this or the closest emergency department or call to 911.  I have explained discharge medications and the need for follow up with the patient/caretakers. This was also printed in the discharge instructions. Patient was discharged with the following medications and follow up:      Medication List        New Prescriptions      cefdinir 300 MG capsule  Commonly known as: OMNICEF  Take 1 capsule by mouth 2 (Two) Times a Day for 7 days.               Where to Get Your Medications        These medications were sent to [facility]/pharmacy #[id number] - [place], [place] - [address] - [phone] [facility] [phone] FX  [address] [place] [address]      Hours: 24-hours Phone: [phone]    cefdinir 300 MG capsule      Brendon Zapata MD  [address]  [address]  [place] [address]  [phone]    Schedule an appointment as soon as possible for a visit in 1 week  As needed       Final diagnoses:   Acute otitis media, unspecified otitis media type        ED Disposition       ED Disposition   Discharge    Condition   Stable    Comment   --               This medical record created using voice recognition software.             Jairon Glass PA-C  12/26/23 0837

## 2023-12-26 NOTE — Clinical Note
[facility] EMERGENCY ROOM  [address]  [place] [address]  Phone: [phone]    Sheba Arnett was seen and treated in our emergency department on 12/26/2023.  She may return to work on 12/29/2023.         Thank you for choosing [facility].    Jairon Glass PA-C

## 2023-12-28 LAB — BACTERIA SPEC AEROBE CULT: NORMAL

## 2024-01-04 ENCOUNTER — APPOINTMENT (OUTPATIENT)
Dept: GENERAL RADIOLOGY | Facility: HOSPITAL | Age: 44
End: 2024-01-04
Payer: COMMERCIAL

## 2024-01-04 ENCOUNTER — HOSPITAL ENCOUNTER (EMERGENCY)
Facility: HOSPITAL | Age: 44
Discharge: HOME | End: 2024-01-04
Attending: EMERGENCY MEDICINE
Payer: COMMERCIAL

## 2024-01-04 VITALS
TEMPERATURE: 98.9 F | BODY MASS INDEX: 34.82 KG/M2 | HEART RATE: 71 BPM | RESPIRATION RATE: 18 BRPM | SYSTOLIC BLOOD PRESSURE: 148 MMHG | HEIGHT: 65 IN | DIASTOLIC BLOOD PRESSURE: 72 MMHG | WEIGHT: 209 LBS | OXYGEN SATURATION: 98 %

## 2024-01-04 DIAGNOSIS — H92.03 REFERRED EAR PAIN, BILATERAL: ICD-10-CM

## 2024-01-04 DIAGNOSIS — J06.9 VIRAL UPPER RESPIRATORY TRACT INFECTION WITH COUGH: Primary | ICD-10-CM

## 2024-01-04 DIAGNOSIS — J02.9 VIRAL PHARYNGITIS: ICD-10-CM

## 2024-01-04 LAB
ALBUMIN SERPL-MCNC: 4.2 G/DL (ref 3.5–5.2)
ALBUMIN/GLOB SERPL: 1.3 G/DL
ALP SERPL-CCNC: 79 U/L (ref 39–117)
ALT SERPL W P-5'-P-CCNC: 9 U/L (ref 1–33)
ANION GAP SERPL CALCULATED.3IONS-SCNC: 12.3 MMOL/L (ref 5–15)
AST SERPL-CCNC: 12 U/L (ref 1–32)
BASOPHILS # BLD AUTO: 0.04 10*3/MM3 (ref 0–0.2)
BASOPHILS NFR BLD AUTO: 0.4 % (ref 0–1.5)
BILIRUB SERPL-MCNC: 0.2 MG/DL (ref 0–1.2)
BUN SERPL-MCNC: 9 MG/DL (ref 6–20)
BUN/CREAT SERPL: 10.7 (ref 7–25)
CALCIUM SPEC-SCNC: 9.3 MG/DL (ref 8.6–10.5)
CHLORIDE SERPL-SCNC: 103 MMOL/L (ref 98–107)
CO2 SERPL-SCNC: 23.7 MMOL/L (ref 22–29)
CREAT SERPL-MCNC: 0.84 MG/DL (ref 0.57–1)
DEPRECATED RDW RBC AUTO: 41.3 FL (ref 37–54)
EGFRCR SERPLBLD CKD-EPI 2021: 88.6 ML/MIN/1.73
EOSINOPHIL # BLD AUTO: 0.06 10*3/MM3 (ref 0–0.4)
EOSINOPHIL NFR BLD AUTO: 0.7 % (ref 0.3–6.2)
ERYTHROCYTE [DISTWIDTH] IN BLOOD BY AUTOMATED COUNT: 12.6 % (ref 12.3–15.4)
FLUAV SUBTYP SPEC NAA+PROBE: NOT DETECTED
FLUBV RNA ISLT QL NAA+PROBE: NOT DETECTED
GLOBULIN UR ELPH-MCNC: 3.2 GM/DL
GLUCOSE SERPL-MCNC: 104 MG/DL (ref 65–99)
HCT VFR BLD AUTO: 44.2 % (ref 34–46.6)
HGB BLD-MCNC: 14.7 G/DL (ref 12–15.9)
HOLD SPECIMEN: NORMAL
HOLD SPECIMEN: NORMAL
IMM GRANULOCYTES # BLD AUTO: 0.02 10*3/MM3 (ref 0–0.05)
IMM GRANULOCYTES NFR BLD AUTO: 0.2 % (ref 0–0.5)
LYMPHOCYTES # BLD AUTO: 2.25 10*3/MM3 (ref 0.7–3.1)
LYMPHOCYTES NFR BLD AUTO: 25.3 % (ref 19.6–45.3)
MCH RBC QN AUTO: 30.2 PG (ref 26.6–33)
MCHC RBC AUTO-ENTMCNC: 33.3 G/DL (ref 31.5–35.7)
MCV RBC AUTO: 90.9 FL (ref 79–97)
MONOCYTES # BLD AUTO: 0.51 10*3/MM3 (ref 0.1–0.9)
MONOCYTES NFR BLD AUTO: 5.7 % (ref 5–12)
NEUTROPHILS NFR BLD AUTO: 6.02 10*3/MM3 (ref 1.7–7)
NEUTROPHILS NFR BLD AUTO: 67.7 % (ref 42.7–76)
NRBC BLD AUTO-RTO: 0 /100 WBC (ref 0–0.2)
PLATELET # BLD AUTO: 303 10*3/MM3 (ref 140–450)
PMV BLD AUTO: 9.1 FL (ref 6–12)
POTASSIUM SERPL-SCNC: 4 MMOL/L (ref 3.5–5.2)
PROT SERPL-MCNC: 7.4 G/DL (ref 6–8.5)
RBC # BLD AUTO: 4.86 10*6/MM3 (ref 3.77–5.28)
RSV RNA NPH QL NAA+NON-PROBE: NOT DETECTED
S PYO AG THROAT QL: NEGATIVE
SARS-COV-2 RNA RESP QL NAA+PROBE: NOT DETECTED
SODIUM SERPL-SCNC: 139 MMOL/L (ref 136–145)
WBC NRBC COR # BLD AUTO: 8.9 10*3/MM3 (ref 3.4–10.8)
WHOLE BLOOD HOLD COAG: NORMAL
WHOLE BLOOD HOLD SPECIMEN: NORMAL

## 2024-01-04 PROCEDURE — 71045 X-RAY EXAM CHEST 1 VIEW: CPT

## 2024-01-04 PROCEDURE — 99283 EMERGENCY DEPT VISIT LOW MDM: CPT

## 2024-01-04 PROCEDURE — 80053 COMPREHEN METABOLIC PANEL: CPT | Performed by: EMERGENCY MEDICINE

## 2024-01-04 PROCEDURE — 85025 COMPLETE CBC W/AUTO DIFF WBC: CPT | Performed by: EMERGENCY MEDICINE

## 2024-01-04 PROCEDURE — 87637 SARSCOV2&INF A&B&RSV AMP PRB: CPT | Performed by: EMERGENCY MEDICINE

## 2024-01-04 PROCEDURE — 87081 CULTURE SCREEN ONLY: CPT | Performed by: EMERGENCY MEDICINE

## 2024-01-04 PROCEDURE — 25010000002 KETOROLAC TROMETHAMINE PER 15 MG: Performed by: EMERGENCY MEDICINE

## 2024-01-04 PROCEDURE — 96374 THER/PROPH/DIAG INJ IV PUSH: CPT

## 2024-01-04 PROCEDURE — 87880 STREP A ASSAY W/OPTIC: CPT | Performed by: EMERGENCY MEDICINE

## 2024-01-04 RX ORDER — DEXTROMETHORPHAN HYDROBROMIDE AND PROMETHAZINE HYDROCHLORIDE 15; 6.25 MG/5ML; MG/5ML
5 SYRUP ORAL 4 TIMES DAILY PRN
Qty: 180 ML | Refills: 0 | Status: SHIPPED | OUTPATIENT
Start: 2024-01-04

## 2024-01-04 RX ORDER — PREDNISONE 20 MG/1
40 TABLET ORAL DAILY
Qty: 10 TABLET | Refills: 0 | Status: SHIPPED | OUTPATIENT
Start: 2024-01-04 | End: 2024-01-09

## 2024-01-04 RX ORDER — IBUPROFEN 800 MG/1
800 TABLET ORAL EVERY 8 HOURS PRN
Qty: 20 TABLET | Refills: 0 | Status: SHIPPED | OUTPATIENT
Start: 2024-01-04

## 2024-01-04 RX ORDER — KETOROLAC TROMETHAMINE 30 MG/ML
30 INJECTION, SOLUTION INTRAMUSCULAR; INTRAVENOUS ONCE
Status: COMPLETED | OUTPATIENT
Start: 2024-01-04 | End: 2024-01-04

## 2024-01-04 RX ADMIN — KETOROLAC TROMETHAMINE 30 MG: 30 INJECTION, SOLUTION INTRAMUSCULAR; INTRAVENOUS at 19:11

## 2024-01-04 NOTE — Clinical Note
[facility] EMERGENCY ROOM  [address] [address]  Phone: [phone]    Sheba Arnett was seen and treated in our emergency department on 1/4/2024.  She may return to work on 01/06/2024.  Off work from January 3 through until January 6, 2024, due to illness.       Thank you for choosing [facility].    Cedrick Root MD

## 2024-01-04 NOTE — ED PROVIDER NOTES
Time: 6:25 PM EST  Date of encounter:  1/4/2024  Independent Historian/Clinical History and Information was obtained by:   Patient    History is limited by: N/A    Chief Complaint   Patient presents with    Earache     Patient states she has had an ear infection for three weeks, been on augmentin and cefdinir with no relief. Patient went to  this morning and was prescribed a medication and has not been able to pick it up. Both ears are effected but right ear is worse.          History of Present Illness:  Patient is a 43 y.o. year old female who presents to the emergency department for evaluation of bilateral ear pain and sore throat.  Patient reports that her right ear hurts more than her left ear.  She has recently completed 2 antibiotics, Augmentin and cefdinir, and still continues to feel ill.  She returned to a local urgent care center today and they prescribed her Levaquin but the pharmacy was out of the medication so therefore she has not taken it.  (MARKUS Funez, APRN)      Patient Care Team  Primary Care Provider: Rita Mcdonough PA-C    Past Medical History:     Allergies   Allergen Reactions    Codeine Itching     History reviewed. No pertinent past medical history.  Past Surgical History:   Procedure Laterality Date    APPENDECTOMY      HYSTERECTOMY      NISSEN FUNDOPLICATION       Family History   Problem Relation Age of Onset    Hypertension Mother     Hypertension Maternal Grandmother     Diabetes Maternal Grandmother     Breast cancer Maternal Grandmother     Diabetes Paternal Uncle        Home Medications:  Prior to Admission medications    Medication Sig Start Date End Date Taking? Authorizing Provider   cetirizine (zyrTEC) 10 MG tablet Take 1 tablet by mouth Daily. 8/16/23   Tj Yee DO   cyclobenzaprine (FLEXERIL) 10 MG tablet Take 1 tablet by mouth 3 (Three) Times a Day As Needed. 6/12/23   Provider, MD Carlene   fluticasone (FLONASE) 50 MCG/ACT nasal spray 2 sprays into the  "nostril(s) as directed by provider Daily. 11/21/23   Annalisa Taylor PA-C    MG tablet Take 1 Tablet by mouth EVERY 6 TO 8 HOURS for Pain. 6/12/23   Carlene Briggs MD   levoFLOXacin (LEVAQUIN) 750 MG tablet Take 1 tablet by mouth Daily for 7 days. 1/4/24 1/11/24  Amie Lowry APRN   omeprazole (priLOSEC) 40 MG capsule Take 1 capsule by mouth Daily. 6/12/23   Carlene Briggs MD   pseudoephedrine (SUDAFED) 60 MG tablet Take 1 tablet by mouth Every 6 (Six) Hours As Needed for Congestion. 1/4/24   Amie Lowry APRN   SUMAtriptan (IMITREX) 50 MG tablet Take 1 tablet by mouth Every 2 (Two) Hours As Needed for Migraine. Take one tablet at onset of headache. May repeat dose one time in 2 hours if headache not relieved.  Do not take more than 4 tablets in a 24-hour period 7/17/23   Amie Lowry APRN   acyclovir (ZOVIRAX) 400 MG tablet Take 2 Tablet by mouth Five times a day for 7 days. 5/1/23 1/4/24  Carlene Briggs MD   pseudoephedrine (Sudafed 12 Hour) 120 MG 12 hr tablet Take 1 tablet by mouth Every 12 (Twelve) Hours. 11/21/23 1/4/24  Annalisa Taylor PA-C        Social History:   Social History     Tobacco Use    Smoking status: Every Day     Packs/day: 1.50     Years: 30.00     Additional pack years: 0.00     Total pack years: 45.00     Types: Cigarettes    Smokeless tobacco: Never   Vaping Use    Vaping Use: Never used   Substance Use Topics    Alcohol use: Not Currently    Drug use: Never         Review of Systems:  Review of Systems   HENT:  Positive for ear pain, sinus pain and sore throat.         Physical Exam:  /88 (Patient Position: Sitting)   Pulse 72   Temp 99.2 °F (37.3 °C) (Oral)   Resp 18   Ht 165.1 cm (65\")   Wt 94.8 kg (208 lb 15.9 oz)   SpO2 98%   BMI 34.78 kg/m²           General: Awake alert and in mild to moderate distress, coughing and sounds congested, looks to be feeling unwell    HEENT: Head normocephalic atraumatic, eyes PERRLA EOMI, nose " normal, oropharynx normal.  No tonsillar enlargement or erythema or exudate or ulcerations.  Bilateral TMs are clear and there is no bulging or erythema or drainage and canals are normal, but bilateral TMs have old scarring from previous tube tympanostomy    Neck: Supple full range of motion, no meningismus, no lymphadenopathy    Heart: Regular rate and rhythm, no murmurs or rubs, 2+ radial pulses bilaterally    Lungs: Clear to auscultation bilaterally without wheezes or crackles, no respiratory distress    Abdomen: Soft, nontender, nondistended, no rebound or guarding    Skin: Warm, dry, no rash    Musculoskeletal: Normal range of motion, no lower extremity edema    Neurologic: Oriented x3, no motor deficits no sensory deficits    Psychiatric: Mood appears stable, no psychosis              Procedures:  Procedures      Medical Decision Making:      Comorbidities that affect care:    History of previous temp tubes in both ears    External Notes reviewed:    None      The following orders were placed and all results were independently analyzed by me:  Orders Placed This Encounter   Procedures    COVID-19, FLU A/B, RSV PCR 1 HR TAT - Swab, Nasopharynx    Rapid Strep A Screen - Swab, Throat    Beta Strep Culture, Throat - Swab, Throat    XR Chest 1 View    Comprehensive Metabolic Panel    [place] Draw    CBC Auto Differential    CBC & Differential    Green Top (Gel)    Lavender Top    Gold Top - SST    Light Blue Top       Medications Given in the Emergency Department:  Medications   ketorolac (TORADOL) injection 30 mg (30 mg Intravenous Given 1/4/24 1911)        ED Course:    The patient was initially evaluated in the triage area where orders were placed. The patient was later dispositioned by Cedrick Root MD.      The patient was advised to stay for completion of workup which includes but is not limited to communication of labs and radiological results, reassessment and plan. The patient was advised that leaving  prior to disposition by a provider could result in critical findings that are not communicated to the patient.     ED Course as of 01/04/24 2127   Thu Jan 04, 2024 1826   --- PROVIDER IN TRIAGE NOTE ---    Patient was seen and evaluated in triage by LACI Mercedes.  Orders were written and the patient is currently awaiting disposition.   [MS]      ED Course User Index  [MS] Armin LACI Hernandez       Labs:    Lab Results (last 24 hours)       Procedure Component Value Units Date/Time    POC Rapid Strep A [[id number]]  (Normal) Resulted: 01/04/24 1025    Specimen: Swab Updated: 01/04/24 1025     Rapid Strep A Screen Negative     Internal Control Passed     Lot Number [id number]     Expiration Date 7/11/26    Covid-19 + Flu A&B AG, Veritor ([id number]) [[id number]]  (Normal) Collected: 01/04/24 1025    Specimen: Swab Updated: 01/04/24 1026     SARS Antigen Not Detected     Influenza A Antigen ELVIA Not Detected     Influenza B Antigen ELVIA Not Detected     Internal Control Passed     Lot Number [id number]     Expiration Date 11/10/24    COVID-19, FLU A/B, RSV PCR 1 HR TAT - Swab, Nasopharynx [[id number]]  (Normal) Collected: 01/04/24 1826    Specimen: Swab from Nasopharynx Updated: 01/04/24 2046     COVID19 Not Detected     Influenza A PCR Not Detected     Influenza B PCR Not Detected     RSV, PCR Not Detected    Narrative:      Fact sheet for providers: https://www.fda.gov/media/671257/download    Fact sheet for patients: https://www.fda.gov/media/873445/download    Test performed by PCR.    Rapid Strep A Screen - Swab, Throat [[id number]]  (Normal) Collected: 01/04/24 1826    Specimen: Swab from Throat Updated: 01/04/24 2023     Strep A Ag Negative    Beta Strep Culture, Throat - Swab, Throat [[id number]] Collected: 01/04/24 1826    Specimen: Swab from Throat Updated: 01/04/24 2023    CBC & Differential [[id number]]  (Normal) Collected: 01/04/24 1834    Specimen: Blood Updated: 01/04/24 1842    Narrative:       The following orders were created for panel order CBC & Differential.  Procedure                               Abnormality         Status                     ---------                               -----------         ------                     CBC Auto Differential[[id number]]        Normal              Final result                 Please view results for these tests on the individual orders.    Comprehensive Metabolic Panel [[id number]]  (Abnormal) Collected: 01/04/24 1834    Specimen: Blood Updated: 01/04/24 1904     Glucose 104 mg/dL      BUN 9 mg/dL      Creatinine 0.84 mg/dL      Sodium 139 mmol/L      Potassium 4.0 mmol/L      Chloride 103 mmol/L      CO2 23.7 mmol/L      Calcium 9.3 mg/dL      Total Protein 7.4 g/dL      Albumin 4.2 g/dL      ALT (SGPT) 9 U/L      AST (SGOT) 12 U/L      Alkaline Phosphatase 79 U/L      Total Bilirubin 0.2 mg/dL      Globulin 3.2 gm/dL      A/G Ratio 1.3 g/dL      BUN/Creatinine Ratio 10.7     Anion Gap 12.3 mmol/L      eGFR 88.6 mL/min/1.73     Narrative:      GFR Normal >60  Chronic Kidney Disease <60  Kidney Failure <15      CBC Auto Differential [[id number]]  (Normal) Collected: 01/04/24 1834    Specimen: Blood Updated: 01/04/24 1842     WBC 8.90 10*3/mm3      RBC 4.86 10*6/mm3      Hemoglobin 14.7 g/dL      Hematocrit 44.2 %      MCV 90.9 fL      MCH 30.2 pg      MCHC 33.3 g/dL      RDW 12.6 %      RDW-SD 41.3 fl      MPV 9.1 fL      Platelets 303 10*3/mm3      Neutrophil % 67.7 %      Lymphocyte % 25.3 %      Monocyte % 5.7 %      Eosinophil % 0.7 %      Basophil % 0.4 %      Immature Grans % 0.2 %      Neutrophils, Absolute 6.02 10*3/mm3      Lymphocytes, Absolute 2.25 10*3/mm3      Monocytes, Absolute 0.51 10*3/mm3      Eosinophils, Absolute 0.06 10*3/mm3      Basophils, Absolute 0.04 10*3/mm3      Immature Grans, Absolute 0.02 10*3/mm3      nRBC 0.0 /100 WBC              Imaging:    XR Chest 1 View    Result Date: 1/4/2024  PROCEDURE: XR CHEST 1 VW  COMPARISON: None   INDICATIONS: COUGH TODAY  FINDINGS:  Heart size and pulmonary vasculature within normal limits.  Lungs clear.  Costophrenic angles sharp       No active cardiopulmonary disease       EDIN PALMA MD       Electronically Signed and Approved By: EDIN PALMA MD on 1/04/2024 at 19:13                Differential Diagnosis and Discussion:      Cough: Differential diagnosis includes but is not limited to pneumonia, acute bronchitis, upper respiratory infection, ACE inhibitor use, allergic reaction, epiglottitis, seasonal allergies, chemical irritants, exercise-induced asthma, viral syndrome.  Ear Pain: Differential diagnosis includes but is not limited to this externa, otitis media, foreign body, bullous myringitis, furuncles, herpes zoster, mastoiditis, trauma, and tumors  Sore Throat: Differential diagnosis includes but is not limited to bacterial infection, viral infection, inhaled irritants, sinus drainage, thyroiditis, epiglottitis, and retropharyngeal abscess.    All labs were reviewed and interpreted by me.  All X-rays impressions were independently interpreted by me.    MDM     Amount and/or Complexity of Data Reviewed  Clinical lab tests: reviewed  Tests in the radiology section of CPT®: reviewed               This patient is a 43-year-old female who presents with cough and congestion and sore throat and pain radiating to the ears for 3 weeks now, not getting better despite taking Augmentin and cefdinir.    I swabbed her for COVID and flu and RSV and strep throat, and they all came back negative.    All of her blood work looks reassuring including normal white blood cell count and no signs of fever or sepsis are seen today.    She has a benign physical exam without signs of bacterial infection on the HEENT exam, but I note she is congested and coughing and looks miserable.    I consider she probably has a self-limiting viral respiratory infection with cough and I will give her some cough syrup and steroids  and ibuprofen 800 mg for pain and a work note.                    Patient Care Considerations:    ANTIBIOTICS: I considered prescribing antibiotics as an outpatient however no bacterial focus of infection was found.      Consultants/Shared Management Plan:        Social Determinants of Health:    Patient is independent, reliable, and has access to care.       Disposition and Care Coordination:    Discharged: The patient is suitable and stable for discharge with no need for consideration of observation or admission.    I have explained the patient´s condition, diagnoses and treatment plan based on the information available to me at this time. I have answered questions and addressed any concerns. The patient has a good  understanding of the patient´s diagnosis, condition, and treatment plan as can be expected at this point. The vital signs have been stable. The patient´s condition is stable and appropriate for discharge from the emergency department.      The patient will pursue further outpatient evaluation with the primary care physician or other designated or consulting physician as outlined in the discharge instructions. They are agreeable to this plan of care and follow-up instructions have been explained in detail. The patient has received these instructions in written format and have expressed an understanding of the discharge instructions. The patient is aware that any significant change in condition or worsening of symptoms should prompt an immediate return to this or the closest emergency department or call to 911.  I have explained discharge medications and the need for follow up with the patient/caretakers. This was also printed in the discharge instructions. Patient was discharged with the following medications and follow up:      Medication List      No changes were made to your prescriptions during this visit.      Rita Mcdonough PA-C  [address] [address]  [phone]    Call  in 3 days  As needed, If symptoms worsen, for a follow-up appointment       Final diagnoses:   Viral upper respiratory tract infection with cough   Referred ear pain, bilateral   Viral pharyngitis        ED Disposition       ED Disposition   Discharge    Condition   Stable    Comment   --               This medical record created using voice recognition software.             Cedrick Root MD  01/04/24 5029

## 2024-01-04 NOTE — ED NOTES
Pt states that she has been experiencing an ear infection for 2 weeks now, sore throat, and yellow nasal drainage. Pt has been treated with two different AXB but still no relief.

## 2024-01-05 NOTE — DISCHARGE INSTRUCTIONS
All of your blood work looked normal and was reassuring today (normal white blood cell count) and all of your swabs for COVID and flu and RSV respiratory virus and strep throat came back negative.    Chest x-ray looks normal (no pneumonia).    It sounds like you have a self-limiting viral respiratory infection with cough that has been prolonged, but you are on a good course of Augmentin antibiotic with no effect and I am doubtful antibiotics will help what appears to be a viral illness that needs to run its course.    You can take ibuprofen 800 mg to help with severe throat and ear pain or headaches or bodyaches, and get some rest and drink plenty of fluids and try purchasing over-the-counter Cepacol throat lozenges to numb your throat and use cough syrup as needed.

## 2024-01-07 LAB — BACTERIA SPEC AEROBE CULT: NORMAL

## 2024-01-18 ENCOUNTER — OFFICE VISIT (OUTPATIENT)
Dept: INTERNAL MEDICINE | Facility: CLINIC | Age: 44
End: 2024-01-18
Payer: COMMERCIAL

## 2024-01-18 VITALS
OXYGEN SATURATION: 99 % | HEIGHT: 65 IN | BODY MASS INDEX: 34.82 KG/M2 | TEMPERATURE: 97.5 F | SYSTOLIC BLOOD PRESSURE: 126 MMHG | DIASTOLIC BLOOD PRESSURE: 78 MMHG | HEART RATE: 100 BPM | RESPIRATION RATE: 14 BRPM | WEIGHT: 209 LBS

## 2024-01-18 DIAGNOSIS — H69.93 DYSFUNCTION OF BOTH EUSTACHIAN TUBES: ICD-10-CM

## 2024-01-18 DIAGNOSIS — J06.9 VIRAL URI WITH COUGH: Primary | ICD-10-CM

## 2024-01-18 PROCEDURE — 99213 OFFICE O/P EST LOW 20 MIN: CPT | Performed by: NURSE PRACTITIONER

## 2024-01-18 NOTE — PROGRESS NOTES
"Chief Complaint  Follow-up (Urgent care follow up), Sore Throat, and Cough    Subjective        Sheba Arnett presents to [facility] [place] for ER follow-up.  Patient reports that she has been feeling quite bad over the last few weeks.  She was initially seen here on 11/21/2023, at that time she had right ear pain.  She had some mild congestion.  She takes Zyrtec regularly.  Exam at that time was unremarkable, prescribed Flonase and Sudafed.  Her symptoms worsen, she was seen in urgent care on 12/20/2023, diagnosed with otitis media and sinus infection, treated with Augmentin.  She continued with symptoms, seen in the ER on 12/26/2023, she was switched to cefdinir for same diagnoses.  Her symptoms continued and was seen in urgent care on 1/4/2024, diagnosed with bacterial upper respiratory infection, they recommended Levaquin, she was unable to get at the pharmacy and ended up back in the emergency room.  Her main complaint was bilateral ear pain.  They treated her as more of a viral upper respiratory infection with eustachian tube dysfunction.  She has been on Sudafed, Zyrtec, Flonase and Promethazine DM, tolerating well with improvement in symptoms.  Feeling 75 to 80% better today.  She has returned to work without any difficulties.    Objective   Vital Signs:   /78 (BP Location: Left arm, Patient Position: Sitting, Cuff Size: Adult)   Pulse 100   Temp 97.5 °F (36.4 °C) (Temporal)   Resp 14   Ht 165.1 cm (65\")   Wt 94.8 kg (209 lb)   SpO2 99%   BMI 34.78 kg/m²     Physical Exam  Vitals and nursing note reviewed.   Constitutional:       Appearance: Normal appearance.   HENT:      Head: Normocephalic and atraumatic.      Right Ear: Ear canal and external ear normal.      Left Ear: Ear canal and external ear normal.      Ears:      Comments: Scarring noted to bilateral tympanic membranes, previous tympanostomy tubes x 3 as a child     Nose: Congestion present.      Mouth/Throat:      " Mouth: Mucous membranes are moist.      Pharynx: Oropharynx is clear.   Eyes:      Conjunctiva/sclera: Conjunctivae normal.      Pupils: Pupils are equal, round, and reactive to light.   Cardiovascular:      Rate and Rhythm: Normal rate and regular rhythm.      Heart sounds: Normal heart sounds.   Pulmonary:      Effort: Pulmonary effort is normal.      Breath sounds: Normal breath sounds.   Skin:     Capillary Refill: Capillary refill takes less than 2 seconds.   Neurological:      Mental Status: She is alert.        Result Review :  {The following data was reviewed by LACI Paulino on 01/18/24                Diagnoses and all orders for this visit:    1. Viral URI with cough (Primary)    2. Dysfunction of both eustachian tubes    Overall, exam is unremarkable today.  She reports significant improvement in her symptoms over the last couple of weeks.  She can continue using Sudafed as needed, she can continue with Promethazine DM as needed, would recommend she continue using Zyrtec and Flonase daily.  Follow-up with PCP at next regular appointment.  Sooner if needed.      Follow Up   No follow-ups on file.  Patient was given instructions and counseling regarding her condition or for health maintenance advice. Please see specific information pulled into the AVS if appropriate.     LACI Paulino  1/18/2024  This note was electronically signed.

## 2024-02-07 NOTE — PROGRESS NOTES
Chief Complaint  Chest Pain and Follow-up (3 mo f/u.  Your last office note said to f/u with Dr. Groves. )    Subjective        History of Present Illness  Sheba Arnett presents to [facility] for follow up.  Patient is a 43-year-old female with past medical history outlined below who presents for follow-up on recent testing.  She continues to have episodes of palpitations.  She reports they are most bothersome for her at night when she lays down to rest and they interrupt her sleep.  She denies associated chest pain or discomfort.  She has no dyspnea, orthopnea, edema, dizziness, lightheadedness or syncope.  She continues to smoke cigarettes.      History reviewed. No pertinent past medical history.    ALLERGY  Allergies   Allergen Reactions    Codeine Itching        Past Surgical History:   Procedure Laterality Date    APPENDECTOMY      HYSTERECTOMY      NISSEN FUNDOPLICATION          Social History     Socioeconomic History    Marital status: Single   Tobacco Use    Smoking status: Every Day     Packs/day: 1.50     Years: 30.00     Additional pack years: 0.00     Total pack years: 45.00     Types: Cigarettes    Smokeless tobacco: Never   Vaping Use    Vaping Use: Never used   Substance and Sexual Activity    Alcohol use: Not Currently    Drug use: Never    Sexual activity: Yes     Partners: Male     Birth control/protection: Hysterectomy       Family History   Problem Relation Age of Onset    Hypertension Mother     Hypertension Maternal Grandmother     Diabetes Maternal Grandmother     Breast cancer Maternal Grandmother     Diabetes Paternal Uncle         Current Outpatient Medications on File Prior to Visit   Medication Sig    cetirizine (zyrTEC) 10 MG tablet Take 1 tablet by mouth Daily.    cyclobenzaprine (FLEXERIL) 10 MG tablet Take 1 tablet by mouth 3 (Three) Times a Day As Needed.    fluticasone (FLONASE) 50 MCG/ACT nasal spray 2 sprays into the nostril(s) as directed by provider  "Daily.    ibuprofen (ADVIL,MOTRIN) 800 MG tablet Take 1 tablet by mouth Every 8 (Eight) Hours As Needed for Moderate Pain or Headache.    omeprazole (priLOSEC) 40 MG capsule Take 1 capsule by mouth Daily.    promethazine-dextromethorphan (PROMETHAZINE-DM) 6.25-15 MG/5ML syrup Take 5 mL by mouth 4 (Four) Times a Day As Needed for Cough.    pseudoephedrine (SUDAFED) 60 MG tablet Take 1 tablet by mouth Every 6 (Six) Hours As Needed for Congestion.    SUMAtriptan (IMITREX) 50 MG tablet Take 1 tablet by mouth Every 2 (Two) Hours As Needed for Migraine. Take one tablet at onset of headache. May repeat dose one time in 2 hours if headache not relieved.  Do not take more than 4 tablets in a 24-hour period     No current facility-administered medications on file prior to visit.       Objective   Vitals:    02/08/24 1252   BP: 124/82   Pulse: 98   Weight: 100 kg (221 lb 6.4 oz)   Height: 165.1 cm (65\")       Physical Exam  Constitutional:       General: She is awake. She is not in acute distress.     Appearance: Normal appearance.   HENT:      Head: Normocephalic.      Nose: Nose normal. No congestion.   Eyes:      Extraocular Movements: Extraocular movements intact.      Conjunctiva/sclera: Conjunctivae normal.      Pupils: Pupils are equal, round, and reactive to light.   Neck:      Thyroid: No thyromegaly.      Vascular: No JVD.   Cardiovascular:      Rate and Rhythm: Normal rate and regular rhythm.      Chest Wall: PMI is not displaced.      Pulses: Normal pulses.      Heart sounds: Normal heart sounds, S1 normal and S2 normal. No murmur heard.     No friction rub. No gallop. No S3 or S4 sounds.   Pulmonary:      Effort: Pulmonary effort is normal.      Breath sounds: Normal breath sounds. No wheezing, rhonchi or rales.   Abdominal:      General: Bowel sounds are normal.      Palpations: Abdomen is soft.      Tenderness: There is no abdominal tenderness.   Musculoskeletal:      Cervical back: No tenderness.      Right " lower leg: No edema.      Left lower leg: No edema.   Lymphadenopathy:      Cervical: No cervical adenopathy.   Skin:     General: Skin is warm and dry.      Capillary Refill: Capillary refill takes less than 2 seconds.      Coloration: Skin is not cyanotic.      Findings: No petechiae or rash.      Nails: There is no clubbing.   Neurological:      Mental Status: She is alert.   Psychiatric:         Mood and Affect: Mood normal.         Behavior: Behavior is cooperative.           Result Review     The following data was reviewed by LACI Regan on 02/08/24.      CMP          6/12/2023    09:52 9/14/2023    16:15 1/4/2024    18:34   CMP   Glucose  82  104    Glucose 68         BUN 11     11  9    Creatinine 0.9     0.81  0.84    EGFR  92.5  88.6    Sodium 139     139  139    Potassium 4.1     4.2  4.0    Chloride 107     104  103    Calcium 8.6     9.3  9.3    Total Protein 6.3     7.0  7.4    Albumin 4.1     4.3  4.2    Globulin  2.7  3.2    Total Bilirubin 0.5     0.4  0.2    Alkaline Phosphatase 51     71  79    AST (SGOT) 13     16  12    ALT (SGPT) 9     11  9    Albumin/Globulin Ratio  1.6  1.3    BUN/Creatinine Ratio 12     13.6  10.7    Anion Gap 8     11.4  12.3       Details          This result is from an external source.             CBC w/diff          6/12/2023    09:52 9/14/2023    16:15 1/4/2024    18:34   CBC w/Diff   WBC 5.8     6.69  8.90    RBC 4.34     4.09  4.86    Hemoglobin 13.6     13.2  14.7    Hematocrit 40.5     37.4  44.2    MCV 93.3     91.4  90.9    MCH 31.5     32.3  30.2    MCHC 33.7     35.3  33.3    RDW 13.2     12.8  12.6    Platelets 223     321  303    Neutrophil Rel % 71.8     64.3  67.7    Immature Granulocyte Rel %  0.3  0.2    Lymphocyte Rel %  26.3  25.3    Monocyte Rel % 6.2     7.2  5.7    Eosinophil Rel % 0.9     1.3  0.7    Basophil Rel % 0.7     0.6  0.4       Details          This result is from an external source.              Lipid Panel           9/14/2023    16:15   Lipid Panel   Total Cholesterol 205    Triglycerides 127    HDL Cholesterol 46    VLDL Cholesterol 23    LDL Cholesterol  136    LDL/HDL Ratio 2.90        Results for orders placed during the hospital encounter of 10/06/23    Adult Transthoracic Echo Complete w/ Color, Spectral and Contrast if necessary per protocol    Interpretation Summary    Left ventricular ejection fraction appears to be 56 - 60%.    Left ventricular wall thickness is consistent with mild septal asymmetric hypertrophy.    Left ventricular diastolic function was normal.    There were no apparent intracardiac masses, vegetations or thrombi.              Procedures    Assessment & Plan  Diagnoses and all orders for this visit:    1. Palpitations (Primary)        1.  Recent Holter monitor was unremarkable.  Recent echocardiogram was normal.  Recommended lifestyle modifications including smoking cessation and decreasing her caffeine intake.  Increase her water intake.  If symptoms persist or intensify can consider beta-blocker.                Follow Up   Return if symptoms worsen or fail to improve.    Patient was given instructions and counseling regarding her condition or for health maintenance advice. Please see specific information pulled into the AVS if appropriate.     Leonor Salcedo, APRN  02/08/24  13:19 EST    Dictated Utilizing Dragon Dictation

## 2024-02-08 ENCOUNTER — OFFICE VISIT (OUTPATIENT)
Dept: CARDIOLOGY | Facility: CLINIC | Age: 44
End: 2024-02-08
Payer: COMMERCIAL

## 2024-02-08 VITALS
SYSTOLIC BLOOD PRESSURE: 124 MMHG | WEIGHT: 221.4 LBS | BODY MASS INDEX: 36.89 KG/M2 | HEIGHT: 65 IN | DIASTOLIC BLOOD PRESSURE: 82 MMHG | HEART RATE: 98 BPM

## 2024-02-08 DIAGNOSIS — R00.2 PALPITATIONS: Primary | ICD-10-CM

## 2024-02-08 PROCEDURE — 99213 OFFICE O/P EST LOW 20 MIN: CPT

## 2024-02-08 PROCEDURE — 1159F MED LIST DOCD IN RCRD: CPT

## 2024-02-08 PROCEDURE — 1160F RVW MEDS BY RX/DR IN RCRD: CPT

## 2024-03-04 ENCOUNTER — OFFICE VISIT (OUTPATIENT)
Dept: INTERNAL MEDICINE | Facility: CLINIC | Age: 44
End: 2024-03-04
Payer: COMMERCIAL

## 2024-03-04 VITALS
HEIGHT: 65 IN | DIASTOLIC BLOOD PRESSURE: 80 MMHG | OXYGEN SATURATION: 99 % | SYSTOLIC BLOOD PRESSURE: 122 MMHG | TEMPERATURE: 97.1 F | WEIGHT: 216.8 LBS | BODY MASS INDEX: 36.12 KG/M2 | HEART RATE: 80 BPM

## 2024-03-04 DIAGNOSIS — R05.1 ACUTE COUGH: Primary | ICD-10-CM

## 2024-03-04 DIAGNOSIS — R09.81 NASAL CONGESTION: ICD-10-CM

## 2024-03-04 LAB
EXPIRATION DATE: NORMAL
EXPIRATION DATE: NORMAL
FLUAV AG UPPER RESP QL IA.RAPID: NOT DETECTED
FLUBV AG UPPER RESP QL IA.RAPID: NOT DETECTED
INTERNAL CONTROL: NORMAL
INTERNAL CONTROL: NORMAL
Lab: 8725
Lab: 9133
S PYO AG THROAT QL: NEGATIVE
SARS-COV-2 AG UPPER RESP QL IA.RAPID: NOT DETECTED

## 2024-03-04 PROCEDURE — 87428 SARSCOV & INF VIR A&B AG IA: CPT | Performed by: NURSE PRACTITIONER

## 2024-03-04 PROCEDURE — 87880 STREP A ASSAY W/OPTIC: CPT | Performed by: NURSE PRACTITIONER

## 2024-03-04 PROCEDURE — 99213 OFFICE O/P EST LOW 20 MIN: CPT | Performed by: NURSE PRACTITIONER

## 2024-03-04 RX ORDER — GUAIFENESIN AND DEXTROMETHORPHAN HYDROBROMIDE 600; 30 MG/1; MG/1
1 TABLET, EXTENDED RELEASE ORAL 2 TIMES DAILY PRN
Qty: 30 TABLET | Refills: 0 | Status: SHIPPED | OUTPATIENT
Start: 2024-03-04

## 2024-03-04 RX ORDER — FLUTICASONE PROPIONATE 50 MCG
2 SPRAY, SUSPENSION (ML) NASAL DAILY
Qty: 11.1 ML | Refills: 0 | Status: SHIPPED | OUTPATIENT
Start: 2024-03-04

## 2024-03-04 RX ORDER — DEXTROMETHORPHAN HYDROBROMIDE AND PROMETHAZINE HYDROCHLORIDE 15; 6.25 MG/5ML; MG/5ML
5 SYRUP ORAL NIGHTLY PRN
Qty: 180 ML | Refills: 0 | Status: SHIPPED | OUTPATIENT
Start: 2024-03-04

## 2024-03-04 RX ORDER — IBUPROFEN 800 MG/1
800 TABLET ORAL EVERY 8 HOURS PRN
Qty: 20 TABLET | Refills: 0 | Status: SHIPPED | OUTPATIENT
Start: 2024-03-04

## 2024-03-04 NOTE — PROGRESS NOTES
"Chief Complaint  Laryngitis (Loss of voice ), Nasal Congestion (X1 day), and Cough (Cough and throat is itchy- X1 day)    Subjective      History of Present Illness    Sheba Arnett is a 43 y.o. female who presents to [facility]     Patient reports nasal congestion, cough and loss of voice x 1 days. Denies fever, shortness of breath, vomiting, diarrhea.  Decreased appetite.  Drinking well.  Plenty of urine output.  She has been treating with Sudafed which did not seem to help.  Coworkers with influenza and COVID. She is requesting a steroid today, reports she wants to keep her symptoms from going to her ears. Patient has had tympanostomy tubes x 3.     Objective   Vital Signs:   Vitals:    03/04/24 1112   BP: 122/80   BP Location: Left arm   Patient Position: Sitting   Cuff Size: Large Adult   Pulse: 80   Temp: 97.1 °F (36.2 °C)   TempSrc: Temporal   SpO2: 99%   Weight: 98.3 kg (216 lb 12.8 oz)   Height: 165.1 cm (65\")     Body mass index is 36.08 kg/m².    Wt Readings from Last 3 Encounters:   03/04/24 98.3 kg (216 lb 12.8 oz)   02/08/24 100 kg (221 lb 6.4 oz)   01/18/24 94.8 kg (209 lb)     BP Readings from Last 3 Encounters:   03/04/24 122/80   02/08/24 124/82   01/18/24 126/78       Health Maintenance   Topic Date Due    Pneumococcal Vaccine 0-64 (1 of 2 - PCV) Never done    TDAP/TD VACCINES (1 - Tdap) Never done    COVID-19 Vaccine (1 - 2023-24 season) Never done    INFLUENZA VACCINE  03/31/2024 (Originally 8/1/2023)    ANNUAL PHYSICAL  09/14/2024    BMI FOLLOWUP  09/14/2024    HEPATITIS C SCREENING  Completed       Physical Exam  Constitutional:       Appearance: Normal appearance.   HENT:      Head: Normocephalic and atraumatic.      Comments: Frontal and maxillary sinus pressure on palpation      Ears:      Comments: Scarring bilateral TM's without effusion or bulging     Nose: Nose normal.      Mouth/Throat:      Mouth: Mucous membranes are moist.      Pharynx: " Oropharynx is clear.   Eyes:      Extraocular Movements: Extraocular movements intact.      Conjunctiva/sclera: Conjunctivae normal.      Pupils: Pupils are equal, round, and reactive to light.   Cardiovascular:      Rate and Rhythm: Normal rate and regular rhythm.      Heart sounds: Normal heart sounds.   Pulmonary:      Effort: Pulmonary effort is normal.      Breath sounds: Normal breath sounds.   Skin:     General: Skin is warm and dry.   Neurological:      General: No focal deficit present.      Mental Status: She is alert and oriented to person, place, and time.   Psychiatric:         Mood and Affect: Mood normal.         Behavior: Behavior normal.         Thought Content: Thought content normal.          Result Review :  The following data was reviewed by: LACI Venegas on 03/04/2024:         Procedures          Assessment & Plan  Acute cough  Exam reassuring, lung sounds clear, O2 sat 99%. Influenza, strep and COVID negative. Likely viral etiology, discussed expected course of illness. Patient aware of cough etiquette, hand hygiene, disinfecting surfaces, and avoiding touching eyes, nose and mouth. Continue supportive care. Increase fluids, monitor output. Tylenol/Motrin for fever/comfort. Mucinex DM for daytime cough, promethazine DM at night. She will seek medical attention immediately with persistent fever, cough, shortness of breath. Patient to call or return to clinic if symptoms worsen or persist. Discussed that steroid is not necessary at this time, she will start daily Flonase in an effort to prevent ear related symptoms.   Nasal congestion      Orders Placed This Encounter   Procedures    POCT SARS-CoV-2 Antigen ELVIA + Flu    POCT rapid strep A     New Medications Ordered This Visit   Medications    ibuprofen (ADVIL,MOTRIN) 800 MG tablet     Sig: Take 1 tablet by mouth Every 8 (Eight) Hours As Needed for Moderate Pain or Headache.     Dispense:  20 tablet     Refill:  0     promethazine-dextromethorphan (PROMETHAZINE-DM) 6.25-15 MG/5ML syrup     Sig: Take 5 mL by mouth At Night As Needed for Cough.     Dispense:  180 mL     Refill:  0    guaifenesin-dextromethorphan 600-30 mg (MUCINEX DM)  MG tablet sustained-release 12 hour     Sig: Take 1 tablet by mouth 2 (Two) Times a Day As Needed (cough).     Dispense:  30 tablet     Refill:  0    fluticasone (FLONASE) 50 MCG/ACT nasal spray     Si sprays into the nostril(s) as directed by provider Daily.     Dispense:  11.1 mL     Refill:  0         FOLLOW UP  Return if symptoms worsen or fail to improve.  Patient was given instructions and counseling regarding her condition or for health maintenance advice. Please see specific information pulled into the AVS if appropriate.       LACI Venegas  24  12:14 EST    CURRENT & DISCONTINUED MEDICATIONS  Current Outpatient Medications   Medication Instructions    cetirizine (ZYRTEC) 10 mg, Oral, Daily    cyclobenzaprine (FLEXERIL) 10 mg, Oral, 3 Times Daily PRN    fluticasone (FLONASE) 50 MCG/ACT nasal spray 2 sprays, Nasal, Daily    guaifenesin-dextromethorphan 600-30 mg (MUCINEX DM)  MG tablet sustained-release 12 hour 1 tablet, Oral, 2 Times Daily PRN    ibuprofen (ADVIL,MOTRIN) 800 mg, Oral, Every 8 Hours PRN    omeprazole (PRILOSEC) 40 mg, Oral, Daily    promethazine-dextromethorphan (PROMETHAZINE-DM) 6.25-15 MG/5ML syrup 5 mL, Oral, Nightly PRN    pseudoephedrine (SUDAFED) 60 mg, Oral, Every 6 Hours PRN    SUMAtriptan (IMITREX) 50 mg, Oral, Every 2 Hours PRN, Take one tablet at onset of headache. May repeat dose one time in 2 hours if headache not relieved.  Do not take more than 4 tablets in a 24-hour period       Medications Discontinued During This Encounter   Medication Reason    ibuprofen (ADVIL,MOTRIN) 800 MG tablet Reorder    promethazine-dextromethorphan (PROMETHAZINE-DM) 6.25-15 MG/5ML syrup Reorder    fluticasone (FLONASE) 50 MCG/ACT nasal spray Reorder